# Patient Record
Sex: FEMALE | Race: WHITE | NOT HISPANIC OR LATINO | Employment: OTHER | URBAN - METROPOLITAN AREA
[De-identification: names, ages, dates, MRNs, and addresses within clinical notes are randomized per-mention and may not be internally consistent; named-entity substitution may affect disease eponyms.]

---

## 2017-01-22 ENCOUNTER — COMMUNICATION - HEALTHEAST (OUTPATIENT)
Dept: SCHEDULING | Facility: CLINIC | Age: 57
End: 2017-01-22

## 2017-01-22 DIAGNOSIS — E03.9 HYPOTHYROID: ICD-10-CM

## 2017-01-25 ENCOUNTER — OFFICE VISIT - HEALTHEAST (OUTPATIENT)
Dept: FAMILY MEDICINE | Facility: CLINIC | Age: 57
End: 2017-01-25

## 2017-01-25 ENCOUNTER — RECORDS - HEALTHEAST (OUTPATIENT)
Dept: GENERAL RADIOLOGY | Facility: CLINIC | Age: 57
End: 2017-01-25

## 2017-01-25 DIAGNOSIS — M54.2 CERVICALGIA: ICD-10-CM

## 2017-01-25 DIAGNOSIS — M54.2 NECK PAIN: ICD-10-CM

## 2017-01-25 ASSESSMENT — MIFFLIN-ST. JEOR: SCORE: 1251.7

## 2017-01-30 ENCOUNTER — COMMUNICATION - HEALTHEAST (OUTPATIENT)
Dept: FAMILY MEDICINE | Facility: CLINIC | Age: 57
End: 2017-01-30

## 2017-01-31 ENCOUNTER — OFFICE VISIT - HEALTHEAST (OUTPATIENT)
Dept: PHYSICAL THERAPY | Facility: CLINIC | Age: 57
End: 2017-01-31

## 2017-01-31 DIAGNOSIS — M54.2 CERVICALGIA: ICD-10-CM

## 2017-01-31 DIAGNOSIS — E03.9 HYPOTHYROIDISM: ICD-10-CM

## 2017-02-08 ENCOUNTER — OFFICE VISIT - HEALTHEAST (OUTPATIENT)
Dept: PHYSICAL THERAPY | Facility: CLINIC | Age: 57
End: 2017-02-08

## 2017-02-08 DIAGNOSIS — E03.9 HYPOTHYROIDISM: ICD-10-CM

## 2017-02-08 DIAGNOSIS — M54.2 CERVICALGIA: ICD-10-CM

## 2017-02-15 ENCOUNTER — OFFICE VISIT - HEALTHEAST (OUTPATIENT)
Dept: PHYSICAL THERAPY | Facility: CLINIC | Age: 57
End: 2017-02-15

## 2017-02-15 DIAGNOSIS — M54.2 CERVICALGIA: ICD-10-CM

## 2017-02-15 DIAGNOSIS — E03.9 HYPOTHYROIDISM: ICD-10-CM

## 2017-02-20 ENCOUNTER — COMMUNICATION - HEALTHEAST (OUTPATIENT)
Dept: FAMILY MEDICINE | Facility: CLINIC | Age: 57
End: 2017-02-20

## 2017-02-20 ENCOUNTER — OFFICE VISIT - HEALTHEAST (OUTPATIENT)
Dept: FAMILY MEDICINE | Facility: CLINIC | Age: 57
End: 2017-02-20

## 2017-02-20 ENCOUNTER — HOSPITAL ENCOUNTER (OUTPATIENT)
Dept: MAMMOGRAPHY | Facility: HOSPITAL | Age: 57
Discharge: HOME | End: 2017-02-20
Attending: FAMILY MEDICINE

## 2017-02-20 DIAGNOSIS — D25.9 LEIOMYOMA OF UTERUS, UNSPECIFIED: ICD-10-CM

## 2017-02-20 DIAGNOSIS — E55.9 VITAMIN D DEFICIENCY: ICD-10-CM

## 2017-02-20 DIAGNOSIS — M54.2 NECK PAIN, MUSCULOSKELETAL: ICD-10-CM

## 2017-02-20 DIAGNOSIS — N36.2 URETHRAL POLYP: ICD-10-CM

## 2017-02-20 DIAGNOSIS — E03.9 HYPOTHYROIDISM: ICD-10-CM

## 2017-02-20 DIAGNOSIS — Z00.00 WELL ADULT EXAM: ICD-10-CM

## 2017-02-20 DIAGNOSIS — Z12.31 VISIT FOR SCREENING MAMMOGRAM: ICD-10-CM

## 2017-02-20 LAB
CHOLEST SERPL-MCNC: 202 MG/DL
FASTING STATUS PATIENT QL REPORTED: YES
HDLC SERPL-MCNC: 88 MG/DL
LDLC SERPL CALC-MCNC: 104 MG/DL
TRIGL SERPL-MCNC: 52 MG/DL

## 2017-02-20 ASSESSMENT — MIFFLIN-ST. JEOR: SCORE: 1211.79

## 2017-02-24 ENCOUNTER — OFFICE VISIT - HEALTHEAST (OUTPATIENT)
Dept: PHYSICAL THERAPY | Facility: CLINIC | Age: 57
End: 2017-02-24

## 2017-02-24 DIAGNOSIS — E03.9 HYPOTHYROIDISM: ICD-10-CM

## 2017-02-24 DIAGNOSIS — M54.2 CERVICALGIA: ICD-10-CM

## 2017-05-23 ENCOUNTER — COMMUNICATION - HEALTHEAST (OUTPATIENT)
Dept: SCHEDULING | Facility: CLINIC | Age: 57
End: 2017-05-23

## 2017-05-23 DIAGNOSIS — E03.9 HYPOTHYROID: ICD-10-CM

## 2017-10-16 ENCOUNTER — RECORDS - HEALTHEAST (OUTPATIENT)
Dept: ADMINISTRATIVE | Facility: OTHER | Age: 57
End: 2017-10-16

## 2017-11-13 ENCOUNTER — OFFICE VISIT - HEALTHEAST (OUTPATIENT)
Dept: FAMILY MEDICINE | Facility: CLINIC | Age: 57
End: 2017-11-13

## 2017-11-13 DIAGNOSIS — F41.9 ANXIETY: ICD-10-CM

## 2017-11-17 ENCOUNTER — COMMUNICATION - HEALTHEAST (OUTPATIENT)
Dept: FAMILY MEDICINE | Facility: CLINIC | Age: 57
End: 2017-11-17

## 2017-12-19 ENCOUNTER — OFFICE VISIT - HEALTHEAST (OUTPATIENT)
Dept: FAMILY MEDICINE | Facility: CLINIC | Age: 57
End: 2017-12-19

## 2017-12-19 DIAGNOSIS — S80.11XA: ICD-10-CM

## 2018-02-22 ENCOUNTER — OFFICE VISIT - HEALTHEAST (OUTPATIENT)
Dept: FAMILY MEDICINE | Facility: CLINIC | Age: 58
End: 2018-02-22

## 2018-02-22 ENCOUNTER — COMMUNICATION - HEALTHEAST (OUTPATIENT)
Dept: FAMILY MEDICINE | Facility: CLINIC | Age: 58
End: 2018-02-22

## 2018-02-22 DIAGNOSIS — Z12.31 VISIT FOR SCREENING MAMMOGRAM: ICD-10-CM

## 2018-02-22 DIAGNOSIS — Z00.00 ROUTINE GENERAL MEDICAL EXAMINATION AT A HEALTH CARE FACILITY: ICD-10-CM

## 2018-02-22 DIAGNOSIS — Z13.820 OSTEOPOROSIS SCREENING: ICD-10-CM

## 2018-02-22 DIAGNOSIS — Z86.39 HISTORY OF VITAMIN D DEFICIENCY: ICD-10-CM

## 2018-02-22 DIAGNOSIS — Z12.83 SKIN CANCER SCREENING: ICD-10-CM

## 2018-02-22 DIAGNOSIS — D48.5 NEOPLASM OF UNCERTAIN BEHAVIOR OF SKIN: ICD-10-CM

## 2018-02-22 DIAGNOSIS — E03.9 HYPOTHYROIDISM: ICD-10-CM

## 2018-02-22 DIAGNOSIS — N36.2 URETHRAL CARUNCLE: ICD-10-CM

## 2018-02-22 LAB
CHOLEST SERPL-MCNC: 240 MG/DL
FASTING STATUS PATIENT QL REPORTED: YES
FASTING STATUS PATIENT QL REPORTED: YES
GLUCOSE BLD-MCNC: 79 MG/DL (ref 70–125)
HDLC SERPL-MCNC: 93 MG/DL
HGB BLD-MCNC: 13.5 G/DL (ref 12–16)
LDLC SERPL CALC-MCNC: 131 MG/DL
TRIGL SERPL-MCNC: 78 MG/DL
TSH SERPL DL<=0.005 MIU/L-ACNC: 0.26 UIU/ML (ref 0.3–5)

## 2018-02-22 ASSESSMENT — MIFFLIN-ST. JEOR: SCORE: 1233.34

## 2018-02-23 LAB
25(OH)D3 SERPL-MCNC: 35.2 NG/ML (ref 30–80)
25(OH)D3 SERPL-MCNC: 35.2 NG/ML (ref 30–80)

## 2018-02-28 ENCOUNTER — COMMUNICATION - HEALTHEAST (OUTPATIENT)
Dept: FAMILY MEDICINE | Facility: CLINIC | Age: 58
End: 2018-02-28

## 2018-02-28 ENCOUNTER — AMBULATORY - HEALTHEAST (OUTPATIENT)
Dept: FAMILY MEDICINE | Facility: CLINIC | Age: 58
End: 2018-02-28

## 2018-02-28 DIAGNOSIS — E03.9 HYPOTHYROIDISM: ICD-10-CM

## 2018-03-02 ENCOUNTER — COMMUNICATION - HEALTHEAST (OUTPATIENT)
Dept: FAMILY MEDICINE | Facility: CLINIC | Age: 58
End: 2018-03-02

## 2018-03-08 ENCOUNTER — RECORDS - HEALTHEAST (OUTPATIENT)
Dept: ADMINISTRATIVE | Facility: OTHER | Age: 58
End: 2018-03-08

## 2018-03-09 ENCOUNTER — RECORDS - HEALTHEAST (OUTPATIENT)
Dept: BONE DENSITY | Facility: CLINIC | Age: 58
End: 2018-03-09

## 2018-03-09 ENCOUNTER — RECORDS - HEALTHEAST (OUTPATIENT)
Dept: ADMINISTRATIVE | Facility: OTHER | Age: 58
End: 2018-03-09

## 2018-03-09 DIAGNOSIS — Z13.820 ENCOUNTER FOR SCREENING FOR OSTEOPOROSIS: ICD-10-CM

## 2018-03-14 ENCOUNTER — COMMUNICATION - HEALTHEAST (OUTPATIENT)
Dept: FAMILY MEDICINE | Facility: CLINIC | Age: 58
End: 2018-03-14

## 2018-03-23 ENCOUNTER — HOSPITAL ENCOUNTER (OUTPATIENT)
Dept: MAMMOGRAPHY | Facility: CLINIC | Age: 58
Discharge: HOME | End: 2018-03-23
Attending: FAMILY MEDICINE

## 2018-03-23 DIAGNOSIS — Z12.31 VISIT FOR SCREENING MAMMOGRAM: ICD-10-CM

## 2018-03-27 ENCOUNTER — COMMUNICATION - HEALTHEAST (OUTPATIENT)
Dept: FAMILY MEDICINE | Facility: CLINIC | Age: 58
End: 2018-03-27

## 2018-04-03 ENCOUNTER — COMMUNICATION - HEALTHEAST (OUTPATIENT)
Dept: FAMILY MEDICINE | Facility: CLINIC | Age: 58
End: 2018-04-03

## 2018-04-04 ENCOUNTER — COMMUNICATION - HEALTHEAST (OUTPATIENT)
Dept: FAMILY MEDICINE | Facility: CLINIC | Age: 58
End: 2018-04-04

## 2018-04-04 DIAGNOSIS — E03.9 HYPOTHYROIDISM: ICD-10-CM

## 2018-04-26 ENCOUNTER — RECORDS - HEALTHEAST (OUTPATIENT)
Dept: ADMINISTRATIVE | Facility: OTHER | Age: 58
End: 2018-04-26

## 2018-06-11 ENCOUNTER — OFFICE VISIT - HEALTHEAST (OUTPATIENT)
Dept: FAMILY MEDICINE | Facility: CLINIC | Age: 58
End: 2018-06-11
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
## 2018-06-11 DIAGNOSIS — E03.9 HYPOTHYROIDISM: ICD-10-CM

## 2018-06-11 LAB
ERYTHROCYTE [DISTWIDTH] IN BLOOD BY AUTOMATED COUNT: 12.3 % (ref 11–14.5)
HCT VFR BLD AUTO: 39.8 % (ref 35–47)
HGB BLD-MCNC: 13.3 G/DL (ref 12–16)
MCH RBC QN AUTO: 31.7 PG (ref 27–34)
MCHC RBC AUTO-ENTMCNC: 33.4 G/DL (ref 32–36)
MCV RBC AUTO: 95 FL (ref 80–100)
PLATELET # BLD AUTO: 169 THOU/UL (ref 140–440)
PMV BLD AUTO: 9.8 FL (ref 8.5–12.5)
RBC # BLD AUTO: 4.19 MILL/UL (ref 3.8–5.4)
TSH SERPL DL<=0.005 MIU/L-ACNC: 1.41 UIU/ML (ref 0.3–5)
WBC: 5.4 THOU/UL (ref 4–11)

## 2018-06-15 ENCOUNTER — OFFICE VISIT - HEALTHEAST (OUTPATIENT)
Dept: FAMILY MEDICINE | Facility: CLINIC | Age: 58
End: 2018-06-15
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
## 2018-06-15 DIAGNOSIS — S91.009A OPEN WOUND OF KNEE, LEG, AND ANKLE: ICD-10-CM

## 2018-06-15 DIAGNOSIS — S81.009A OPEN WOUND OF KNEE, LEG, AND ANKLE: ICD-10-CM

## 2018-06-15 DIAGNOSIS — S81.809A OPEN WOUND OF KNEE, LEG, AND ANKLE: ICD-10-CM

## 2018-06-29 LAB — RABV NAB SER NT-ACNC: NORMAL IU/ML

## 2018-07-05 ENCOUNTER — OFFICE VISIT - HEALTHEAST (OUTPATIENT)
Dept: VASCULAR SURGERY | Facility: CLINIC | Age: 58
End: 2018-07-05

## 2018-07-05 DIAGNOSIS — T14.8XXA BITE WOUND: ICD-10-CM

## 2018-07-05 DIAGNOSIS — T14.8XXA LOCAL INFECTION OF WOUND: ICD-10-CM

## 2018-07-05 DIAGNOSIS — L08.9 LOCAL INFECTION OF WOUND: ICD-10-CM

## 2018-07-31 ENCOUNTER — COMMUNICATION - HEALTHEAST (OUTPATIENT)
Dept: FAMILY MEDICINE | Facility: CLINIC | Age: 58
End: 2018-07-31

## 2018-07-31 DIAGNOSIS — E03.9 HYPOTHYROIDISM: ICD-10-CM

## 2018-08-02 ENCOUNTER — OFFICE VISIT - HEALTHEAST (OUTPATIENT)
Dept: VASCULAR SURGERY | Facility: CLINIC | Age: 58
End: 2018-08-02

## 2018-08-02 DIAGNOSIS — T14.8XXA BITE WOUND: ICD-10-CM

## 2018-08-06 ENCOUNTER — COMMUNICATION - HEALTHEAST (OUTPATIENT)
Dept: FAMILY MEDICINE | Facility: CLINIC | Age: 58
End: 2018-08-06

## 2018-08-06 DIAGNOSIS — N95.2 VAGINAL ATROPHY: ICD-10-CM

## 2018-08-07 RX ORDER — ESTRADIOL 0.1 MG/G
CREAM VAGINAL
Qty: 42.5 G | Refills: 5 | Status: SHIPPED | OUTPATIENT
Start: 2018-08-07 | End: 2022-08-06

## 2018-09-10 ENCOUNTER — RECORDS - HEALTHEAST (OUTPATIENT)
Dept: ADMINISTRATIVE | Facility: OTHER | Age: 58
End: 2018-09-10

## 2018-09-26 ENCOUNTER — COMMUNICATION - HEALTHEAST (OUTPATIENT)
Dept: FAMILY MEDICINE | Facility: CLINIC | Age: 58
End: 2018-09-26

## 2018-09-26 DIAGNOSIS — D25.9 UTERINE FIBROID: ICD-10-CM

## 2018-09-26 DIAGNOSIS — R10.2 PELVIC PAIN IN FEMALE: ICD-10-CM

## 2018-09-28 ENCOUNTER — HOSPITAL ENCOUNTER (OUTPATIENT)
Dept: ULTRASOUND IMAGING | Facility: HOSPITAL | Age: 58
Discharge: HOME | End: 2018-09-28
Attending: FAMILY MEDICINE

## 2018-09-28 DIAGNOSIS — R10.2 PELVIC PAIN IN FEMALE: ICD-10-CM

## 2018-09-28 DIAGNOSIS — D25.9 UTERINE FIBROID: ICD-10-CM

## 2018-10-01 ENCOUNTER — COMMUNICATION - HEALTHEAST (OUTPATIENT)
Dept: FAMILY MEDICINE | Facility: CLINIC | Age: 58
End: 2018-10-01

## 2018-12-21 ENCOUNTER — RECORDS - HEALTHEAST (OUTPATIENT)
Dept: ADMINISTRATIVE | Facility: OTHER | Age: 58
End: 2018-12-21

## 2019-02-14 ENCOUNTER — RECORDS - HEALTHEAST (OUTPATIENT)
Dept: ADMINISTRATIVE | Facility: OTHER | Age: 59
End: 2019-02-14

## 2019-03-01 ENCOUNTER — HOSPITAL ENCOUNTER (OUTPATIENT)
Dept: ULTRASOUND IMAGING | Facility: HOSPITAL | Age: 59
Discharge: HOME | End: 2019-03-01
Attending: FAMILY MEDICINE

## 2019-03-01 ENCOUNTER — OFFICE VISIT - HEALTHEAST (OUTPATIENT)
Dept: FAMILY MEDICINE | Facility: CLINIC | Age: 59
End: 2019-03-01

## 2019-03-01 DIAGNOSIS — Z00.00 ROUTINE GENERAL MEDICAL EXAMINATION AT A HEALTH CARE FACILITY: ICD-10-CM

## 2019-03-01 DIAGNOSIS — N36.2 URETHRAL CARUNCLE: ICD-10-CM

## 2019-03-01 DIAGNOSIS — Z12.31 VISIT FOR SCREENING MAMMOGRAM: ICD-10-CM

## 2019-03-01 DIAGNOSIS — E03.9 ACQUIRED HYPOTHYROIDISM: ICD-10-CM

## 2019-03-01 DIAGNOSIS — Z85.828 HISTORY OF SQUAMOUS CELL CARCINOMA OF SKIN: ICD-10-CM

## 2019-03-01 DIAGNOSIS — M79.661 RIGHT CALF PAIN: ICD-10-CM

## 2019-03-01 LAB
CHOLEST SERPL-MCNC: 248 MG/DL
FASTING STATUS PATIENT QL REPORTED: NO
FASTING STATUS PATIENT QL REPORTED: NO
GLUCOSE BLD-MCNC: 72 MG/DL (ref 70–125)
HDLC SERPL-MCNC: 94 MG/DL
HGB BLD-MCNC: 14.1 G/DL (ref 12–16)
LDLC SERPL CALC-MCNC: 131 MG/DL
TRIGL SERPL-MCNC: 117 MG/DL
TSH SERPL DL<=0.005 MIU/L-ACNC: 13.33 UIU/ML (ref 0.3–5)

## 2019-03-01 ASSESSMENT — MIFFLIN-ST. JEOR: SCORE: 1253.97

## 2019-03-04 ENCOUNTER — AMBULATORY - HEALTHEAST (OUTPATIENT)
Dept: FAMILY MEDICINE | Facility: CLINIC | Age: 59
End: 2019-03-04

## 2019-03-04 DIAGNOSIS — E03.9 ACQUIRED HYPOTHYROIDISM: ICD-10-CM

## 2019-06-25 ENCOUNTER — HOSPITAL ENCOUNTER (OUTPATIENT)
Dept: MAMMOGRAPHY | Facility: CLINIC | Age: 59
Discharge: HOME | End: 2019-06-25
Attending: FAMILY MEDICINE

## 2019-06-25 DIAGNOSIS — Z12.31 VISIT FOR SCREENING MAMMOGRAM: ICD-10-CM

## 2019-08-30 ENCOUNTER — RECORDS - HEALTHEAST (OUTPATIENT)
Dept: ADMINISTRATIVE | Facility: OTHER | Age: 59
End: 2019-08-30

## 2019-10-25 ENCOUNTER — RECORDS - HEALTHEAST (OUTPATIENT)
Dept: ADMINISTRATIVE | Facility: OTHER | Age: 59
End: 2019-10-25

## 2020-02-07 ENCOUNTER — RECORDS - HEALTHEAST (OUTPATIENT)
Dept: ADMINISTRATIVE | Facility: OTHER | Age: 60
End: 2020-02-07

## 2020-02-25 ENCOUNTER — COMMUNICATION - HEALTHEAST (OUTPATIENT)
Dept: FAMILY MEDICINE | Facility: CLINIC | Age: 60
End: 2020-02-25

## 2020-02-25 DIAGNOSIS — L29.9 ITCHING: ICD-10-CM

## 2020-03-19 ENCOUNTER — COMMUNICATION - HEALTHEAST (OUTPATIENT)
Dept: FAMILY MEDICINE | Facility: CLINIC | Age: 60
End: 2020-03-19

## 2020-04-03 ENCOUNTER — COMMUNICATION - HEALTHEAST (OUTPATIENT)
Dept: FAMILY MEDICINE | Facility: CLINIC | Age: 60
End: 2020-04-03

## 2020-04-03 DIAGNOSIS — N64.4 BREAST PAIN, LEFT: ICD-10-CM

## 2020-04-03 DIAGNOSIS — L29.9 ITCHING: ICD-10-CM

## 2020-05-01 ENCOUNTER — HOSPITAL ENCOUNTER (OUTPATIENT)
Dept: MAMMOGRAPHY | Facility: CLINIC | Age: 60
Discharge: HOME | End: 2020-05-01
Attending: FAMILY MEDICINE

## 2020-05-01 DIAGNOSIS — N64.4 BREAST PAIN, LEFT: ICD-10-CM

## 2020-07-10 ENCOUNTER — OFFICE VISIT - HEALTHEAST (OUTPATIENT)
Dept: FAMILY MEDICINE | Facility: CLINIC | Age: 60
End: 2020-07-10

## 2020-07-10 DIAGNOSIS — Z00.00 ANNUAL PHYSICAL EXAM: ICD-10-CM

## 2020-07-10 DIAGNOSIS — R07.89 CHEST WALL PAIN: ICD-10-CM

## 2020-07-10 DIAGNOSIS — E03.9 HYPOTHYROIDISM: ICD-10-CM

## 2020-07-10 DIAGNOSIS — Z11.4 ENCOUNTER FOR SCREENING FOR HIV: ICD-10-CM

## 2020-07-10 DIAGNOSIS — Z11.59 NEED FOR HEPATITIS C SCREENING TEST: ICD-10-CM

## 2020-07-10 LAB
CHOLEST SERPL-MCNC: 212 MG/DL
FASTING STATUS PATIENT QL REPORTED: YES
FASTING STATUS PATIENT QL REPORTED: YES
GLUCOSE BLD-MCNC: 74 MG/DL (ref 70–125)
HDLC SERPL-MCNC: 85 MG/DL
HGB BLD-MCNC: 12.7 G/DL (ref 12–16)
HIV 1+2 AB+HIV1 P24 AG SERPL QL IA: NEGATIVE
LDLC SERPL CALC-MCNC: 112 MG/DL
TRIGL SERPL-MCNC: 77 MG/DL
TSH SERPL DL<=0.005 MIU/L-ACNC: 14.17 UIU/ML (ref 0.3–5)

## 2020-07-10 ASSESSMENT — MIFFLIN-ST. JEOR: SCORE: 1151.69

## 2020-07-13 LAB
HCV AB SERPL QL IA: NEGATIVE
HPV SOURCE: NORMAL
HUMAN PAPILLOMA VIRUS 16 DNA: NEGATIVE
HUMAN PAPILLOMA VIRUS 18 DNA: NEGATIVE
HUMAN PAPILLOMA VIRUS FINAL DIAGNOSIS: NORMAL
HUMAN PAPILLOMA VIRUS OTHER HR: NEGATIVE
SPECIMEN DESCRIPTION: NORMAL

## 2020-07-14 ENCOUNTER — AMBULATORY - HEALTHEAST (OUTPATIENT)
Dept: FAMILY MEDICINE | Facility: CLINIC | Age: 60
End: 2020-07-14

## 2020-07-14 ENCOUNTER — COMMUNICATION - HEALTHEAST (OUTPATIENT)
Dept: FAMILY MEDICINE | Facility: CLINIC | Age: 60
End: 2020-07-14

## 2020-07-14 DIAGNOSIS — E03.9 ACQUIRED HYPOTHYROIDISM: ICD-10-CM

## 2020-09-16 ENCOUNTER — RECORDS - HEALTHEAST (OUTPATIENT)
Dept: ADMINISTRATIVE | Facility: OTHER | Age: 60
End: 2020-09-16

## 2020-09-21 ENCOUNTER — COMMUNICATION - HEALTHEAST (OUTPATIENT)
Dept: FAMILY MEDICINE | Facility: CLINIC | Age: 60
End: 2020-09-21

## 2020-12-21 ENCOUNTER — COMMUNICATION - HEALTHEAST (OUTPATIENT)
Dept: FAMILY MEDICINE | Facility: CLINIC | Age: 60
End: 2020-12-21

## 2020-12-24 ENCOUNTER — AMBULATORY - HEALTHEAST (OUTPATIENT)
Dept: LAB | Facility: CLINIC | Age: 60
End: 2020-12-24

## 2020-12-24 DIAGNOSIS — E03.9 ACQUIRED HYPOTHYROIDISM: ICD-10-CM

## 2020-12-24 LAB — TSH SERPL DL<=0.005 MIU/L-ACNC: 27.59 UIU/ML (ref 0.3–5)

## 2020-12-25 ENCOUNTER — COMMUNICATION - HEALTHEAST (OUTPATIENT)
Dept: FAMILY MEDICINE | Facility: CLINIC | Age: 60
End: 2020-12-25

## 2020-12-28 ENCOUNTER — COMMUNICATION - HEALTHEAST (OUTPATIENT)
Dept: FAMILY MEDICINE | Facility: CLINIC | Age: 60
End: 2020-12-28

## 2020-12-28 DIAGNOSIS — E03.9 ACQUIRED HYPOTHYROIDISM: ICD-10-CM

## 2021-01-26 ENCOUNTER — COMMUNICATION - HEALTHEAST (OUTPATIENT)
Dept: FAMILY MEDICINE | Facility: CLINIC | Age: 61
End: 2021-01-26

## 2021-01-29 ENCOUNTER — OFFICE VISIT - HEALTHEAST (OUTPATIENT)
Dept: FAMILY MEDICINE | Facility: CLINIC | Age: 61
End: 2021-01-29

## 2021-01-29 DIAGNOSIS — R07.89 LEFT-SIDED CHEST WALL PAIN: ICD-10-CM

## 2021-01-29 DIAGNOSIS — N64.4 BREAST PAIN, LEFT: ICD-10-CM

## 2021-02-03 ENCOUNTER — COMMUNICATION - HEALTHEAST (OUTPATIENT)
Dept: FAMILY MEDICINE | Facility: CLINIC | Age: 61
End: 2021-02-03

## 2021-02-08 ENCOUNTER — COMMUNICATION - HEALTHEAST (OUTPATIENT)
Dept: FAMILY MEDICINE | Facility: CLINIC | Age: 61
End: 2021-02-08

## 2021-02-08 DIAGNOSIS — L29.9 ITCHING: ICD-10-CM

## 2021-02-08 RX ORDER — HYDROXYZINE HYDROCHLORIDE 25 MG/1
25 TABLET, FILM COATED ORAL EVERY 8 HOURS PRN
Qty: 30 TABLET | Refills: 3 | Status: SHIPPED | OUTPATIENT
Start: 2021-02-08 | End: 2021-08-04

## 2021-02-15 ENCOUNTER — AMBULATORY - HEALTHEAST (OUTPATIENT)
Dept: LAB | Facility: CLINIC | Age: 61
End: 2021-02-15

## 2021-02-15 DIAGNOSIS — E03.9 ACQUIRED HYPOTHYROIDISM: ICD-10-CM

## 2021-02-15 LAB — TSH SERPL DL<=0.005 MIU/L-ACNC: 0.05 UIU/ML (ref 0.3–5)

## 2021-02-16 ENCOUNTER — COMMUNICATION - HEALTHEAST (OUTPATIENT)
Dept: FAMILY MEDICINE | Facility: CLINIC | Age: 61
End: 2021-02-16

## 2021-02-16 ENCOUNTER — AMBULATORY - HEALTHEAST (OUTPATIENT)
Dept: FAMILY MEDICINE | Facility: CLINIC | Age: 61
End: 2021-02-16

## 2021-02-16 DIAGNOSIS — E03.9 ACQUIRED HYPOTHYROIDISM: ICD-10-CM

## 2021-03-19 ENCOUNTER — COMMUNICATION - HEALTHEAST (OUTPATIENT)
Dept: FAMILY MEDICINE | Facility: CLINIC | Age: 61
End: 2021-03-19

## 2021-03-19 DIAGNOSIS — E03.9 HYPOTHYROIDISM: ICD-10-CM

## 2021-03-22 ENCOUNTER — COMMUNICATION - HEALTHEAST (OUTPATIENT)
Dept: FAMILY MEDICINE | Facility: CLINIC | Age: 61
End: 2021-03-22

## 2021-03-22 DIAGNOSIS — E03.9 ACQUIRED HYPOTHYROIDISM: ICD-10-CM

## 2021-03-29 ENCOUNTER — AMBULATORY - HEALTHEAST (OUTPATIENT)
Dept: LAB | Facility: CLINIC | Age: 61
End: 2021-03-29

## 2021-03-29 DIAGNOSIS — E03.9 HYPOTHYROIDISM: ICD-10-CM

## 2021-03-29 LAB — TSH SERPL DL<=0.005 MIU/L-ACNC: 0.09 UIU/ML (ref 0.3–5)

## 2021-04-01 ENCOUNTER — AMBULATORY - HEALTHEAST (OUTPATIENT)
Dept: FAMILY MEDICINE | Facility: CLINIC | Age: 61
End: 2021-04-01

## 2021-04-01 DIAGNOSIS — E03.9 ACQUIRED HYPOTHYROIDISM: ICD-10-CM

## 2021-04-01 RX ORDER — LEVOTHYROXINE SODIUM 112 UG/1
112 TABLET ORAL DAILY
Qty: 90 TABLET | Refills: 3 | Status: SHIPPED | OUTPATIENT
Start: 2021-04-01 | End: 2022-03-02

## 2021-04-03 ENCOUNTER — COMMUNICATION - HEALTHEAST (OUTPATIENT)
Dept: FAMILY MEDICINE | Facility: CLINIC | Age: 61
End: 2021-04-03

## 2021-04-16 ENCOUNTER — AMBULATORY - HEALTHEAST (OUTPATIENT)
Dept: NURSING | Facility: CLINIC | Age: 61
End: 2021-04-16

## 2021-05-07 ENCOUNTER — AMBULATORY - HEALTHEAST (OUTPATIENT)
Dept: NURSING | Facility: CLINIC | Age: 61
End: 2021-05-07

## 2021-05-10 ENCOUNTER — HOSPITAL ENCOUNTER (OUTPATIENT)
Dept: MAMMOGRAPHY | Facility: CLINIC | Age: 61
Discharge: HOME | End: 2021-05-10
Attending: FAMILY MEDICINE

## 2021-05-10 DIAGNOSIS — Z12.31 VISIT FOR SCREENING MAMMOGRAM: ICD-10-CM

## 2021-05-27 ENCOUNTER — RECORDS - HEALTHEAST (OUTPATIENT)
Dept: ADMINISTRATIVE | Facility: CLINIC | Age: 61
End: 2021-05-27

## 2021-05-27 ENCOUNTER — AMBULATORY - HEALTHEAST (OUTPATIENT)
Dept: LAB | Facility: CLINIC | Age: 61
End: 2021-05-27

## 2021-05-27 DIAGNOSIS — E03.9 ACQUIRED HYPOTHYROIDISM: ICD-10-CM

## 2021-05-27 LAB — TSH SERPL DL<=0.005 MIU/L-ACNC: 0.58 UIU/ML (ref 0.3–5)

## 2021-05-28 ENCOUNTER — RECORDS - HEALTHEAST (OUTPATIENT)
Dept: ADMINISTRATIVE | Facility: CLINIC | Age: 61
End: 2021-05-28

## 2021-05-29 ENCOUNTER — RECORDS - HEALTHEAST (OUTPATIENT)
Dept: ADMINISTRATIVE | Facility: CLINIC | Age: 61
End: 2021-05-29

## 2021-05-30 ENCOUNTER — RECORDS - HEALTHEAST (OUTPATIENT)
Dept: ADMINISTRATIVE | Facility: CLINIC | Age: 61
End: 2021-05-30

## 2021-05-30 VITALS — HEIGHT: 68 IN | BODY MASS INDEX: 21.49 KG/M2 | WEIGHT: 141.8 LBS

## 2021-05-30 VITALS — BODY MASS INDEX: 22.5 KG/M2 | HEIGHT: 66 IN | WEIGHT: 140 LBS

## 2021-05-31 ENCOUNTER — RECORDS - HEALTHEAST (OUTPATIENT)
Dept: ADMINISTRATIVE | Facility: CLINIC | Age: 61
End: 2021-05-31

## 2021-05-31 VITALS — HEIGHT: 67 IN | BODY MASS INDEX: 22.17 KG/M2 | WEIGHT: 141.25 LBS

## 2021-05-31 VITALS — BODY MASS INDEX: 22.6 KG/M2 | WEIGHT: 140 LBS

## 2021-05-31 VITALS — BODY MASS INDEX: 22.84 KG/M2 | WEIGHT: 141.5 LBS

## 2021-06-01 ENCOUNTER — RECORDS - HEALTHEAST (OUTPATIENT)
Dept: ADMINISTRATIVE | Facility: CLINIC | Age: 61
End: 2021-06-01

## 2021-06-01 VITALS — WEIGHT: 138.06 LBS | BODY MASS INDEX: 21.62 KG/M2

## 2021-06-01 VITALS — WEIGHT: 139.31 LBS | BODY MASS INDEX: 21.82 KG/M2

## 2021-06-02 ENCOUNTER — RECORDS - HEALTHEAST (OUTPATIENT)
Dept: ADMINISTRATIVE | Facility: CLINIC | Age: 61
End: 2021-06-02

## 2021-06-02 VITALS — WEIGHT: 145.8 LBS | BODY MASS INDEX: 22.88 KG/M2 | HEIGHT: 67 IN

## 2021-06-04 VITALS
OXYGEN SATURATION: 97 % | WEIGHT: 125 LBS | SYSTOLIC BLOOD PRESSURE: 121 MMHG | HEART RATE: 76 BPM | DIASTOLIC BLOOD PRESSURE: 80 MMHG | TEMPERATURE: 98.2 F | HEIGHT: 67 IN | BODY MASS INDEX: 19.62 KG/M2

## 2021-06-05 VITALS
SYSTOLIC BLOOD PRESSURE: 130 MMHG | OXYGEN SATURATION: 99 % | TEMPERATURE: 98.6 F | BODY MASS INDEX: 20.75 KG/M2 | HEART RATE: 97 BPM | WEIGHT: 130.5 LBS | DIASTOLIC BLOOD PRESSURE: 80 MMHG

## 2021-06-08 NOTE — PROGRESS NOTES
Assessment:     Gloria was seen today for neck pain and referral.    Diagnoses and all orders for this visit:    Neck pain  -     Cancel: XR Cervical Spine 4 - 5 VWS; Future  -     cyclobenzaprine (FLEXERIL) 10 MG tablet; Take 1 tablet (10 mg total) by mouth 3 (three) times a day as needed for muscle spasms.  -     naproxen (NAPROSYN) 500 MG tablet; Take 1 tablet (500 mg total) by mouth 2 (two) times a day with meals.  -     Ambulatory referral to PT/OT        Plan:     1. Neck pain  We'll review x-rays today and initiate OT PT at physical therapy.  She'll use cyclobenzaprine at night.  Low dose of 5 mg given  - cyclobenzaprine (FLEXERIL) 10 MG tablet; Take 1 tablet (10 mg total) by mouth 3 (three) times a day as needed for muscle spasms.  Dispense: 30 tablet; Refill: 0  - naproxen (NAPROSYN) 500 MG tablet; Take 1 tablet (500 mg total) by mouth 2 (two) times a day with meals.  Dispense: 60 tablet; Refill: 2  - Ambulatory referral to PT/OT      This is a 25 minute visit with greater than 50% of the time spent counseling regarding discussion of our workup with patient.  I'm pleased that she's tried chiropractic and acupuncture already.  She hasn't been able to alleviate her problem but it was a good try.  Should she also has made good progress with ergonomic adjustment at her work.  She got a new mouse and he can stand at times.  The use of her right arm could be contributing and aggravating her right neck issue.  This may need to be discussed as a work-related issue at some point.    We'll start with C-spine x-rays and physical therapy, Naprosyn and muscle relaxers.  She'll come back in about a month at which time we'll reassess.  If need be will move ahead towards MRI scanning of the neck and her spine/  May wish also to proceed to the [facility] on neck and spine clinic and/or trial of oral steroids which were discussed today but not used      Subjective:      Luis A is a 56 y.o. female presenting to my clinic  for evaluation of chronic right-sided neck pain.  Gloria says she's had this neck problem for maybe over a year.  She's tried ergonomic assessment of her work station with having a standing workstation and a new mouse.  She seen a chiropractor over the last several months and and has gone for acupuncture.    She works for the [employer].    When asked about trauma she says she broke her collarbone on the right which was 5 years old.  About 4 5 years ago she fell on the ice and hit her head.  She does seem to have lots of eustachian tube dysfunction and ear problems on the right and has seen ENT for that but is unable to make it go away..      She denies any symptomatology that radiates down the shoulder or into the arm.  She has no hand or arm dysfunction she tells me.  She noticed just this morning looking in the mirror that there is some bit of an asymmetry with left shoulder being higher than the right and sensitive a little bit of a neck rotation towards the right.  She does have some suspicion that this may have to do with her using her right arm for work.      Current Outpatient Prescriptions on File Prior to Visit   Medication Sig Dispense Refill     aspirin 81 MG EC tablet Take 1 tablet (81 mg total) by mouth daily.       B INFANTIS/B ANI/B EUSEBIO/B BIFID (PROBIOTIC 4X ORAL) Take by mouth.       calcium-vitamin D (CALCIUM-VITAMIN D) 500 mg(1,250mg) -200 unit per tablet Take 1 tablet by mouth 2 (two) times a day with meals.       ergocalciferol (ERGOCALCIFEROL) 50,000 unit capsule One tab twice a week for 12 weeks then refill. 24 capsule 1     estradiol (ESTRACE) 0.01 % (0.1 mg/gram) vaginal cream Use 0.5 gm three times a week. 42.5 g 5     levothyroxine (SYNTHROID, LEVOTHROID) 125 MCG tablet TAKE 1 TABLET BY MOUTH DAILY. 90 tablet 0     [DISCONTINUED] BLACK COHOSH ORAL Take by mouth.       No current facility-administered medications on file prior to visit.      No Known Allergies  Past Medical History  "  Diagnosis Date     Fibroids      Hypothyroidism      Vaginal atrophy      Past Surgical History   Procedure Laterality Date     Pr cystourethroscopy,fulgur <0.5 cm lesn       Description: Cystoscopy With Fulguration Minor Lesion (Under 5mm);  Recorded: 12/31/2012;     Social History     Social History     Marital status:      Spouse name: N/A     Number of children: N/A     Years of education: N/A     Occupational History     Not on file.     Social History Main Topics     Smoking status: Former Smoker     Packs/day: 0.25     Years: 3.00     Quit date: 1/19/1980     Smokeless tobacco: Never Used     Alcohol use 0.6 oz/week     1 Glasses of wine per week      Comment: On ocassions     Drug use: No     Sexual activity: Not Currently     Partners: Male     Birth control/ protection: Post-menopausal     Other Topics Concern     Not on file     Social History Narrative     Family History   Problem Relation Age of Onset     Hypothyroidism Mother      Narcolepsy Mother      Heart disease Father      Asthma Sister      Hypothyroidism Sister      Asthma Brother      Breast cancer Maternal Grandmother      Breast cancer Cousin      Breast cancer Cousin      Breast cancer Cousin        ROS:  I have performed a 10 point ROS.  All pertinent positives and negatives are found in the HPI.  All others are negative.    Denies symptoms going down the arm.    Objective:     Physical Exam:  Visit Vitals     /78 (Patient Site: Right Arm, Patient Position: Sitting, Cuff Size: Adult Regular)     Pulse 88     Ht 5' 8\" (1.727 m)     Wt 141 lb 12.8 oz (64.3 kg)     BMI 21.56 kg/m2     General Appearance: Alert, cooperative, no distress, appears stated age  Head: Normocephalic, without obvious abnormality, atraumatic    Throat: Lips, mucosa, and tongue normal; teeth and gums normal  Neck: Neck tension with pain in the cervical branch of the trapezius muscle and spasm palpated.  Trigger point to the right occiput.  There seems to " be an asymmetry with the spasm muscles are pulling her neck anatomy to the right., no adenopathy;  thyroid: not enlarged; no carotid bruit or JVD    Back: Upper back and neck shows asymmetry of musculature.  She seems to be rotated a bit to the right.  Shoulders are uneven  Extremities: Extremities normal, atraumatic, no cyanosis or edema/  Full range of motion with arm on the right and left.  No signs of impingement or rotator cuff issues  Skin: Skin color, texture, turgor normal, no rashes or lesions  Lymph nodes: Cervical, supraclavicular, and axillary nodes normal  Neurologic: Intact, no focal deficits /no neurological abnormalities 2 right arm  Mental status:  Appropriate, Affect normal          Imaging: We'll review today's C-spine x-rays

## 2021-06-08 NOTE — PROGRESS NOTES
[facility] Daily Progress     Patient Name: Gloria Myers  Date: 2017  Visit #:   Referral Diagnosis:Neck Pain  Referring provider: Kathleen Najera MD  Visit Diagnosis:     ICD-10-CM    1. Cervicalgia M54.2    2. Hypothyroidism E03.9          Assessment:   Right ant SI found which could cause a right long leg.  Patient demonstrates understanding/independence with home program.  Patient is benefitting from skilled physical therapy and is making steady progress toward functional goals.    Goal Status:  Patient Turn Head: for work;for driving;for conversation;Comment  Comment: in 8 weeks with no sharp right neck pain  Pt will: have end of day pain at work 0-4/10 in 8 weeks    Plan / Patient Education:   Add RC band strengthening, Check SI and continue MT- MAS? Arterial vs neural fascia  Continue with initial plan of care.  Progress with home program as tolerated.    Subjective:     Pain Ratin   I got up out of bed, and I felt a little dizzy.  My head and ears are always plugged      Objective:     Right PSIS high and + FB test , right leg longer in standing  C2 Spinous process prominent right and tight lacho than  Left scalene tightness    Treatment Today     TREATMENT MINUTES COMMENTS   Evaluation     Self-care/ Home management     Manual therapy 45 Supine MT/SCS to L MTC-LV, L STEM-LV, R MAS and PAR-LV, R VERT-A,  LF C2 and C5 MS, general stretching of ant scalenes bilat, suboccipital release   Neuromuscular Re-education     Therapeutic Activity     Therapeutic Exercises 15 Discussed SI anatomy, Self care and issued SI correction exercises   Gait training     Modality__________________                Total 60    Blank areas are intentional and mean the treatment did not include these items.       Nancy Celis  2017

## 2021-06-08 NOTE — PROGRESS NOTES
[facility] Daily Progress     Patient Name: Gloria Myers  Date: 2/15/2017  Visit #: 3/12  Referral Diagnosis: Neck Pain  Referring provider: Kathleen Najera MD  Visit Diagnosis:     ICD-10-CM    1. Cervicalgia M54.2    2. Hypothyroidism E03.9          Assessment:   Pt reports slightly less dizziness since starting PT. Balance testing shows no apparent dysfunction in the vestibular or LE strength, sl this feeling of imbalance is likely as a result of neck/upper vertebral/fascial dysfunction.  Patient demonstrates understanding/independence with home program.  Patient is benefitting from skilled physical therapy and is making steady progress toward functional goals.    Goal Status:  Patient Turn Head: for work;for driving;for conversation;Comment  Comment: in 8 weeks with no sharp right neck pain  Pt will: have end of day pain at work 0-4/10 in 8 weeks         Plan / Patient Education:   Continue MT to decrease right cervical paraspinal spasm, check goal of less sharp neck pain  Continue with initial plan of care.  Progress with home program as tolerated.    Subjective:     Pain Rating: 3  Less dizzy since treatment.  Right ear feels closed and pops  Pt reports she changed her mouse position at work, so that right elbow is at her side in neutral instead of shoulder flexed 90 degreees    Objective:     Romberg eyes open and closed 30 seconds, minimal sway  SL stand : bilat 25 seconds, but slight Trendelenburg on the right  Hip Adbuction strength: left 5, right 4+  PSIS level and neg FB test  Palpation shows tight right cervical paraspinal    Treatment Today     TREATMENT MINUTES COMMENTS   Evaluation     Self-care/ Home management     Manual therapy 5- nc STM right QL   Neuromuscular Re-education     Therapeutic Activity     Therapeutic Exercises 55 Added neck stretching, hip strengthening, and band shoulder strengthening. Yellow and orange rep band issued. Correct form emphasized   Gait training      Modality__________________                Total 60    Blank areas are intentional and mean the treatment did not include these items.       Nancy Celis  2/15/2017     Statement Selected

## 2021-06-08 NOTE — PROGRESS NOTES
[facility]   Cervical Thoracic Initial Evaluation    Patient Name: Gloria Myers  Date of evaluation: 1/31/2017  Referral Diagnosis: Neck pain  Referring provider: Kathleen Najera MD  Visit Diagnosis:     ICD-10-CM    1. Cervicalgia M54.2    2. Hypothyroidism E03.9        Assessment:    Pt appears to have fascial dysfunction of at least 3 systems primarily involving the head neck and rib cage.  Pt. is a good candidate for skilled PT services to improve pain levels and function.    Goals:  Patient Turn Head: for work;for driving;for conversation;Comment  Comment: in 8 weeks with no sharp right neck pain  Pt will: have end of day pain at work 0-4/10 in 8 weeks    Patient's expectations/goals are realistic.    Barriers to Learning or Achieving Goals:  No Barriers.       Plan / Patient Instructions:        Plan of Care:   Communication with: Referral Source  Patient Related Instruction: Nature of Condition;Treatment plan and rationale;Self Care instruction;Basis of treatment;Body mechanics;Posture;Precautions;Expected outcome  Times per Week: 1  Number of Weeks: 8  Number of Visits: 8  Discharge Planning: Independant in self management and home exercises  Therapeutic Exercise: ROM;Stretching;Strengthening  Neuromuscular Reeducation: kinesio tape;posture  Manual Therapy: soft tissue mobilization;myofascial release;joint mobilization;muscle energy;strain counterstrain;craniosacral therapy;visceral manipulation  Functional Training (ADL's): self care;ADL's;ergonomics  Equipment: theraband    Plan for next visit: Continue MT/SCS to neck, and add rotator cuff and scapular stengthening     Subjective:         Social information:   Living Situation:single family home, lives with others , stairs  with railing and has assistance Yes   Occupation:Clerical   Work Status:Working full time   Equipment Available: Has nothing but heating pad for the neck, does have orthotics,. Was issued an ergonomic keypad which  resolved UE tingling, and has a sit to stand desk    History of Present Illness:    Gloria is a 56 y.o. female who presents to therapy today with complaints of neck pain. SHe thinks some of this neck pain may have started after falling down the stairs due to ice in . A recent cervical X-ray 17 shows min narrowing of C4-5 and C6-7 with flattened neck curve and mild DJD of the lower cervical facets.  Pt reports she tried Naprosyn 2 days, but became very dizzy and had to stop.    Pain Ratin  Pain rating at best: 2  Pain rating at worst: 10  Pain description: Dull ache to shaarp severe pain such that pt sees stars. No pain or tingling down the right arm.    Functional limitations are described as occurring with:   End of work day (clerical job), and better on the weekends  Turning head to right- 10/10  Low grade HA most days (face area bilat)       Patient reports she uses heating pad to sleep at night in winter. She has gone to chiropractor for 2 years which helps, acupuncture 1 x on right sciatic type pain (lsharp lateral thigh). Self massage does help decrease pain.    Patient saw an orthopedist at [facility] ([facility]) for fallen arches and was given orthotics. She is only one pair of shoes she can wear the orthotics.         Objective:      Note: Items left blank indicates the item was not performed or not indicated at the time of the evaluation.    Patient Outcome Measures :    Neck Disability Score in %: 8   Scores range from 0-100%, where a score of 0% represents minimal pain and maximal function. The minmal clinically important difference is a score reduction of 10%.    Cervical Thoracic Examination  1. Cervicalgia     2. Hypothyroidism       Involved side: Right  Posture Observation:      Flattened mid thoracic area, and slight kyphosis with FHP. Pelvis ;level    Cervical ROM:    Date:      *Indicate scale AROM AROM AROM   Cervical Flexion 3 fingerwidths chin to chest     Cervical Extension  100% - no symptoms      Right Left Right Left Right Left   Cervical Sidebending         Cervical Rotation 78 74       Cervical Protraction      Cervical Retraction      Thoracic Flexion      Thoracic Extension      Thoracic Sidebending         Thoracic Rotation           Strength     Date:      Cervical Myotomes/5 Right Left Right Left Right Left   Cervical Flexion (C1-2)         Shoulder IR/ER 4+/4+ 4/4       Shoulder Elevation (C4)         Shoulder Abduction (C5) 4+ 4       Elbow Flexion (C6)         Elbow Extension (C7)         Wrist Flexion (C7)         Wrist Extension (C6)         Thumb abduction (C8)         Finger Abduction (T1)           Sensation        Reflex Testing  Cervical Dermatomes Right Left UE Reflexes Right Left   Back of the Head (C2)   Biceps (C5-6)     Supraclavicular Fossa (C3)   Brachioradialis (C5-6)     AC Joint (C4)   Triceps (C7-8)     Lateral Biceps (C5)   Rafa s test     Palmar Thumb (C6) nl nl LE Reflexes     Palmar 3rd Finger (C7) nl nl Patellar (L3-4)     Palmar 5th Finger (C8) nl nl Achilles (S1-2)     Ulnar Forearm (T1)   Babinski Response          test: Right 58#, left 56 ( in normal range)  Cranial scan: positive bilat head (has mild fascial pain - possibly sinus) and right neck for LV, right head and neck arterial and visceral  Palpation: Left scalene and right lev scap tight, extemely stiff and decrease right anterior rib motion ribs 1-4    Cervical Special Tests     Cervical Special Tests Right Left UE Nerve Mobility Right Left   Cervical compression   Median nerve     Cervical distraction   Ulnar nerve     Spurling s test neg neg Radial nerve     Shoulder abduction sign   Thoracic outlet     Deep neck flexor endurance test   Brooks     Upper cervical rotation   Adson s     Sharper-Edin   Cervical rotation lateral flexion     Alar ligament test   Other:     Other:   Other:       UE Screen: WFL shoulder flexibility    Treatment Today     TREATMENT MINUTES COMMENTS    Evaluation 30 Neck and Upper back/ribs   Self-care/ Home management 15 Discussed neutral spin and body position, need to change position at work,  how UE strength affects neck, and issued 2 easy stretches to change position.    Manual therapy 15 SUpine STM for left RADHA-LV, and L IJ-LV   Neuromuscular Re-education     Therapeutic Activity     Therapeutic Exercises     Gait training     Modality__________________       Other: patient was dizzy after getting up from plinth so we discussed how to protect neck during this transition         Total 60    Blank areas are intentional and mean the treatment did not include these items.     PT Evaluation Code: (Please list factors)  Patient History/Comorbidities: Neck pain, Hypothroid  Examination: 1 -neck  Clinical Presentation: Stable  Clinical Decision Making: Low    Patient History/  Comorbidities Examination  (body structures and functions, activity limitations, and/or participation restrictions) Clinical Presentation Clinical Decision Making (Complexity)   No documented Comorbidities or personal factors 1-2 Elements Stable and/or uncomplicated Low   1-2 documented comorbidities or personal factor 3 Elements Evolving clinical presentation with changing characteristics Moderate   3-4 documented comorbidities or personal factors 4 or more Unstable and unpredictable High               Nancy Celis  1/31/2017  4:03 PM

## 2021-06-09 NOTE — TELEPHONE ENCOUNTER
----- Message from Alisha Hale MD sent at 7/14/2020  8:53 AM CDT -----  Please let pt know that we need to increase the dose of levothyroxine. I will send a new prescription to her pharmacy. Please ask her to come in for lab visit in 6-8 weeks to recheck thyroid labs.

## 2021-06-09 NOTE — PROGRESS NOTES
[facility] Daily Progress /Discharge Summary    Patient Name: Gloria Myers  Date: 2017  Visit #:   Referral Diagnosis: Neck Pain  Referring provider: Kathleen Najera MD  Visit Diagnosis:     ICD-10-CM    1. Cervicalgia M54.2    2. Hypothyroidism E03.9          Assessment:   Pt meeting goals today overall.  Patient demonstrates understanding/independence with home program.  Response to Intervention good  Patient is benefitting from skilled physical therapy and is making steady progress toward functional goals.    Goal Status:  Patient Turn Head: for work;for driving;for conversation;Comment  Comment: in 8 weeks with no sharp right neck pain  Pt will: have end of day pain at work 0-10 in 8 weeks     Neck pain at end of work day yesterday was 2/10  Able to turn neck with no sharp neck pain now to the right    Plan / Patient Education:     Pt is generally meeting goals after 4 visits. She is compliant with her home program, and has attended all scheduled PT.  She was told to consider 1-2 visits after 2-3 weeks.  Check AROM and do NDI    She has not contacted us in the past 2 months so PT episode of care was discontinued.    Subjective:     Pain Ratin  No sharp neck pains when turning head to the right  No overall problems with the exercises    Objective:     Right rotation 55 degrees without sharp pain  PSIS level    Treatment Today     TREATMENT MINUTES COMMENTS   Evaluation     Self-care/ Home management     Manual therapy 55 Supine MT/SCS to neck and UB  Stacked fascial dysfunctions LF C3-T1 MS, R PGC4-T4 -N, Maulik CC-A,R SIB-V, R VERT-A   Neuromuscular Re-education     Therapeutic Activity     Therapeutic Exercises     Gait training     Modality__________________                Total 60    Blank areas are intentional and mean the treatment did not include these items.       Nancy Celis  2017

## 2021-06-09 NOTE — PROGRESS NOTES
Annual Physical    1. Well adult exam  Glucose    Lipid [place]    Hemoglobin   2. Hypothyroidism  Thyroid Stimulating Hormone (TSH)   3. Vitamin D deficiency  Vitamin D, Total (25-Hydroxy)   4. Leiomyoma Of The Uterus     5. Urethral polyp     6. Neck pain, musculoskeletal       Discussion of plan  Normal will visit tests  Surveillance of thyroid hormone and replacement levothyroxine with TSH  Surveillance of vitamin D deficiency with vitamin D lab  Examination of uterus reveals a small uterus without palpable problems in the right mid/right lower quadrant.  Surveillance of ultrasounds shows decrease in size of multiple fibroids.  Symptoms decreased.  Ultrasound discussed and we decided not needed  Urethral polyp appears stable on not grown not changed not bleeding.  Will monitor yearly.  Refer to urology should bleeding change her pain evolve.  We'll repeat bone density testing at age 63 and start with  prevnar 13 and pneumococcl vaccines    The patient recently was seen for cervical neck pain and referred to physical therapy.  She is working with a bands at home and says her symptoms have greatly improved she is plans to continue with home exercise/    Follow-up preventive care testing dates reviewed with patient    Subjective:      Luis A is a 56 y.o. female presenting to my clinic for annual physical exam.  Gloria's been in good health and does walk 3-4 times a week for exercise.  She is  has 2 children and works at the [employer].    She's had adequate preventative care testing and will have her mammogram this afternoon.  She is up-to-date with her Pap colonoscopy and DVT.  She is due for pneumococcal vaccines at age 63.  Screen for hepatitis C discussed that she does not have any risk factors.  She takes vitamin D replacement and has hypothyroidism and takes levothyroxine replacement.  Testing of both today.    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  She denies any bleeding pain or  notable change in her urethra.  She also has had leiomyomas of the uterus identified on ultrasounds dating back to 2012.  We've been following this over the years.  In 2015 at 2016 she had some right-sided abdominal pain.  We followed her ultrasound and saw last year that the uterine myomas were getting smaller she.  She does not have any vaginal bleeding.  She rarely gets the right mid abdomen tenderness to her right abdominal wall.  Ultrasound is reviewed and discussed examination today of uterus.    She reports some neck symptoms greatly improved by going to physical therapy.  She is working with home exercise and bands  Current Outpatient Prescriptions on File Prior to Visit   Medication Sig Dispense Refill     B INFANTIS/B ANI/B EUSEBIO/B BIFID (PROBIOTIC 4X ORAL) Take by mouth.       calcium-vitamin D (CALCIUM-VITAMIN D) 500 mg(1,250mg) -200 unit per tablet Take 1 tablet by mouth 2 (two) times a day with meals.       ergocalciferol (ERGOCALCIFEROL) 50,000 unit capsule One tab twice a week for 12 weeks then refill. 24 capsule 1     estradiol (ESTRACE) 0.01 % (0.1 mg/gram) vaginal cream Use 0.5 gm three times a week. (Patient taking differently: Use 0.5 gm weekly) 42.5 g 5     hydrOXYzine (ATARAX) 25 MG tablet Take 25 mg by mouth. As needed for itching       levothyroxine (SYNTHROID, LEVOTHROID) 125 MCG tablet TAKE 1 TABLET BY MOUTH DAILY. 90 tablet 0     aspirin 81 MG EC tablet Take 1 tablet (81 mg total) by mouth daily.       naproxen (NAPROSYN) 500 MG tablet Take 1 tablet (500 mg total) by mouth 2 (two) times a day with meals. 60 tablet 2     No current facility-administered medications on file prior to visit.      No Known Allergies  Past Medical History:   Diagnosis Date     Fibroids      Hypothyroidism      Vaginal atrophy      Past Surgical History:   Procedure Laterality Date     ND CYSTOURETHROSCOPY,FULGUR <0.5 CM ADRIANA      Description: Cystoscopy With Fulguration Minor Lesion (Under 5mm);  Recorded:  "12/31/2012;     Social History     Social History     Marital status:      Spouse name: N/A     Number of children: N/A     Years of education: N/A     Occupational History     Not on file.     Social History Main Topics     Smoking status: Former Smoker     Packs/day: 0.25     Years: 3.00     Quit date: 1/19/1980     Smokeless tobacco: Never Used     Alcohol use 0.6 oz/week     1 Glasses of wine per week      Comment: On ocassions     Drug use: No     Sexual activity: Not Currently     Partners: Male     Birth control/ protection: Post-menopausal     Other Topics Concern     Not on file     Social History Narrative     Family History   Problem Relation Age of Onset     Hypothyroidism Mother      Narcolepsy Mother      Heart disease Father      Asthma Sister      Hypothyroidism Sister      Breast cancer Maternal Grandmother      Breast cancer Cousin      Breast cancer Cousin      Breast cancer Cousin      Asthma Brother        Review of systems: The rest of the review of systems are negative for all systems.  12 point system reviewed, negative except for HPI items     Current allergies, medications, and problem list are all reviewed and updated in the chart.    Healthy Habits:   Regular Exercise: Yes  Healthy Diet: Yes  Dental Visits Regularly: Yes  Seat Belt: Yes   Colonoscopy: Yes  Lipid Profile: Yes  Glucose Screen: Yes  DEXA scan 2011-stable    Physical exam:  Vitals:    02/20/17 0802   BP: 122/80   Patient Site: Left Arm   Patient Position: Sitting   Cuff Size: Adult Regular   Pulse: 72   Resp: 20   Temp: 98  F (36.7  C)   TempSrc: Oral   Weight: 140 lb (63.5 kg)   Height: 5' 6\" (1.676 m)     Body mass index is 22.6 kg/(m^2). low BMI discussed    Health Maintenance reviewed:  Lipid Profile: Yes  Glucose Screen: Yes  Colonoscopy: Yes  Mammogram: Normal recheck today    Immunization History   Administered Date(s) Administered     DT (pediatric) 01/01/2004     Influenza, inj, historic 10/12/2007, " 11/06/2008, 10/01/2014, 10/01/2015, 10/01/2016     Td, historic 11/16/2009     Tdap 11/16/2009     Immunization status: up to date and documented.    Gynecologic History  No LMP recorded. Patient is postmenopausal.  Contraception: post menopausal status  Last Pap: 2015. Results were: Normal with negative HPV/repeat 2020      General Appearance:  Alert, cooperative, no distress, appears stated age   Head:  Normocephalic, without obvious abnormality, atraumatic   Eyes:  PERRL, conjunctiva/corneas clear, EOM's intact   Ears:  Normal TM's and external ear canals, both ears   Nose: Nares normal, septum midline,mucosa normal, no drainage    Throat: Lips, mucosa, and tongue normal; teeth and gums normal   Neck: Supple, symmetrical, trachea midline, no adenopathy;  thyroid: not enlarged, symmetric, no normal thyroid/still has some tenderness in the right side of her neck much improved; no carotid bruit or JVD   Back:   Symmetric, no curvature, ROM normal, mild tenderness in right side of her left much improved    Lungs:   Clear to auscultation bilaterally, respirations unlabored   Breasts:  No breast masses, tenderness, asymmetry, or nipple discharge.   Heart:  Regular rate and rhythm, S1 and S2 normal, no murmur, rub, or gallop   Abdomen:   Soft, non-tender, bowel sounds active, no masses, no organomegaly   Genitalia: Normally developed genitalia with no external lesions or eruptions.  Urinary urethra examined and evidence of carbuncle/polyp unchanged from last year.  inflammation, discharge or tenderness.  No cystocele.  Uterus normal size and position.  Uterus is a little lopsided but appears within normal limits and small.  No tender spots palpated despite known fibroids    Rectal:  No hemorrhoids   Extremities: Extremities normal, atraumatic, no cyanosis or edema   Skin: Skin color, texture, turgor normal, no rashes or lesions   Lymph nodes: Cervical, supraclavicular, and axillary nodes normal   Neurologic: Normal, CN  2-12 intact                 The following high BMI interventions were performed this visit: encouragement to exercise and weight monitoring        Kathleen Najera MD  [facility]  [phone]    [facility]  This note has been dictated using voice recognition software. Any grammatical or context distortions are unintentional and inherent to the software.

## 2021-06-09 NOTE — TELEPHONE ENCOUNTER
Patient Returning Call  Reason for call:  Lab results   Information relayed to patient:  See Alisha Hale MD's message below  Patient has additional questions:  No  If YES, what are your questions/concerns:  NA  Okay to leave a detailed message?: No call back needed

## 2021-06-09 NOTE — PROGRESS NOTES
Assessment:        1. Annual physical exam  Hemoglobin    Glucose    Lipid [place]    Gynecologic Cytology (PAP Smear)   2. Hypothyroidism  Thyroid Stimulating Hormone (TSH)   3. Need for hepatitis C screening test  Hepatitis C Antibody (Anti-HCV)   4. Encounter for screening for HIV  HIV Antigen/Antibody Screening [place]   5. Chest wall pain         Plan:      1. Healthcare maintenance: Pap smear performed today.  Mammogram was done in May 2020.  Next colonoscopy due in 2023 due to history of polyps.  DEXA scan due in 2023.  Tetanus booster administered today.  Counseled on vaccine and side effects.  Check fasting lipids, glucose and hemoglobin.  Encouraged regular exercise and healthy diet.  Follow-up in 1 year for yearly physical  2. Hypothyroidism: Continue levothyroxine.  Check TSH  3. Hep C screen performed  4. HIV screen performed  5. Chest wall pain: Recent diagnostic mammogram was negative.  She has been using supplements to help with breast pain without much benefit.  Description sounds more musculoskeletal.  Suggested that she consult with her chiropractor as next step.  6.  Dry skin near corner of nose: Continue with moisturizing cream.  May  try some OTC hydrocortisone cream.  See dermatologist if not resolving.        Subjective:      Gloria Myers is a 59 y.o. female who presents for an annual exam.  We reviewed her health history.  She was last seen for a physical in 2019.  No significant changes in her health.  Remains in good overall health.  She does have hypothyroidism and takes levothyroxine.  She has history of some anxiety and occasional panic attacks.  Uses hydroxyzine on an as-needed basis.  She has history of uterine fibroids that have previously been evaluated by ultrasound.  Has history of urethral caruncle and has previously been evaluated by [facility].  It is benign and she uses topical estrogen cream once a week and does not have any problems.  She is  and she has twin  sons.  She works at [employer] and has been there for the past 38 years.  Has continued to work amidst the COVID-19 pandemic.  On review of systems she reports that she continues to have some problems with left breast/chest wall pain.  It is burning.  Comes and goes.  She did have mammogram in May.  No abnormalities found.  Was advised to try some vitamin E and primrose oil.  This has not been helpful.  She wonders about other options.  She also reports that with walking a couple of toes on her feet will go numb.  This will resolve when she stops to rest.  She does wear arch supports and proper footwear.  She does have a little bit of flaky skin near the lower corner of the left side of her nose.  This is from wearing a mask.  She has been putting Vaseline on it.  Wonders if anything else can be used.  She will be seeing her dermatologist in the near future for yearly skin exam due to history of skin cancer.  She is up-to-date on vision and dental exams.  Review of systems otherwise negative.  No other concerns or questions.    Healthy Habits:   Regular Exercise: Yes  Sunscreen Use: Yes  Healthy Diet: Yes  Dental Visits Regularly: Yes  Seat Belt: Yes  Sexually active: Yes  Self Breast Exam Monthly:Yes  Hemoccults: N/A  Flex Sig: N/A  Colonoscopy: Yes  Lipid Profile: Yes  Glucose Screen: Yes  Prevention of Osteoporosis: Yes      Immunization History   Administered Date(s) Administered     DT (pediatric) 01/01/2004     INFLUENZA,RECOMBINANT,INJ,PF QUADRIVALENT 18+YRS 10/25/2019     Influenza, Seasonal, Inj PF IIV3 09/10/2018     Influenza, inj, historic,unspecified 10/12/2007, 11/06/2008, 10/01/2014, 10/01/2015, 10/01/2016, 10/16/2017     Influenza,seasonal, Inj IIV3 10/12/2006     Td,adult,historic,unspecified 11/16/2009     Tdap 11/16/2009     ZOSTER, RECOMBINANT, IM 10/25/2019     Immunization status: due today.      Gynecologic History  No LMP recorded. Patient is postmenopausal.  Contraception: post  menopausal status  Last Pap: . Results were: normal  Last mammogram: . Results were: normal      OB History    Para Term  AB Living   1 1 1         SAB TAB Ectopic Multiple Live Births                  # Outcome Date GA Lbr Alirio/2nd Weight Sex Delivery Anes PTL Lv   1 Term                Current Outpatient Medications   Medication Sig Dispense Refill     B INFANTIS/B ANI/B EUSEBIO/B BIFID (PROBIOTIC 4X ORAL) Take by mouth.       calcium-vitamin D (CALCIUM-VITAMIN D) 500 mg(1,250mg) -200 unit per tablet Take 1 tablet by mouth 2 (two) times a day with meals.       estradiol (ESTRACE) 0.01 % (0.1 mg/gram) vaginal cream Use 0.5 gm weekly 42.5 g 5     hydrOXYzine HCL (ATARAX) 25 MG tablet Take 1 tablet (25 mg total) by mouth every 8 (eight) hours as needed for itching. As needed for itching 30 tablet 3     levothyroxine (SYNTHROID) 125 MCG tablet Take 1 tablet (125 mcg total) by mouth daily. 90 tablet 3     aspirin 81 MG EC tablet Take 1 tablet (81 mg total) by mouth daily.       No current facility-administered medications for this visit.      Past Medical History:   Diagnosis Date     Anxiety      Fibroids      Hypothyroidism      Vaginal atrophy      Past Surgical History:   Procedure Laterality Date     IA CYSTOURETHROSCOPY,FULGUR <0.5 CM ADRIANA      Description: Cystoscopy With Fulguration Minor Lesion (Under 5mm);  Recorded: 2012;     Patient has no known allergies.  Family History   Problem Relation Age of Onset     Hypothyroidism Mother      Narcolepsy Mother      Asthma Mother      Heart disease Father      Asthma Sister      Hypothyroidism Sister      Breast cancer Maternal Grandmother      Breast cancer Cousin      Breast cancer Cousin      Breast cancer Cousin      Asthma Brother      No Medical Problems Son      No Medical Problems Son      Social History     Socioeconomic History     Marital status:      Spouse name: Not on file     Number of children: Not on file     Years of  "education: Not on file     Highest education level: Not on file   Occupational History     Not on file   Social Needs     Financial resource strain: Not on file     Food insecurity     Worry: Not on file     Inability: Not on file     Transportation needs     Medical: Not on file     Non-medical: Not on file   Tobacco Use     Smoking status: Former Smoker     Packs/day: 0.25     Years: 3.00     Pack years: 0.75     Last attempt to quit: 1980     Years since quittin.5     Smokeless tobacco: Never Used   Substance and Sexual Activity     Alcohol use: Yes     Alcohol/week: 1.0 standard drinks     Types: 1 Glasses of wine per week     Binge frequency: Weekly     Comment: On ocassions     Drug use: No     Sexual activity: Not Currently     Partners: Male     Birth control/protection: Post-menopausal   Lifestyle     Physical activity     Days per week: Not on file     Minutes per session: Not on file     Stress: Not on file   Relationships     Social connections     Talks on phone: Not on file     Gets together: Not on file     Attends [employer] service: Not on file     Active member of club or organization: Not on file     Attends meetings of clubs or organizations: Not on file     Relationship status: Not on file     Intimate partner violence     Fear of current or ex partner: Not on file     Emotionally abused: Not on file     Physically abused: Not on file     Forced sexual activity: Not on file   Other Topics Concern     Not on file   Social History Narrative    Lives .       Review of Systems    See HPI      Objective:         /80 (Patient Site: Left Arm, Patient Position: Sitting, Cuff Size: Adult Regular)   Pulse 76   Temp 98.2  F (36.8  C) (Oral)   Ht 5' 6.5\" (1.689 m)   Wt 125 lb (56.7 kg)   SpO2 97%   BMI 19.87 kg/m        Physical Exam:  General Appearance: Alert, cooperative, no distress, appears stated age  Head: Normocephalic, without obvious abnormality, atraumatic  Eyes: " PERRL, conjunctiva/corneas clear, EOM's intact  Ears: Normal TM's and external ear canals, both ears  Nose: Nares normal, septum midline,mucosa normal, no drainage  Throat: Lips, mucosa, and tongue normal; teeth and gums normal  Neck: Supple, symmetrical, trachea midline, no adenopathy;  thyroid: not enlarged, symmetric, no tenderness/mass/nodules;   Back: Symmetric, no curvature, ROM normal  Lungs: Clear to auscultation bilaterally, respirations unlabored  Breasts: No breast masses, tenderness, asymmetry, or nipple discharge.  Heart: Regular rate and rhythm, S1 and S2 normal, no murmur, rub, or gallop, Abdomen: Soft, non-tender, bowel sounds active all four quadrants,  no masses, no organomegaly  Pelvic:Normally developed genitalia with no external lesions or eruptions. Urethral caruncle present. Vagina and cervix show no lesions, inflammation, discharge or tenderness. No cystocele, No rectocele. Uterus alfredito.  No adnexal mass or tenderness.  Extremities: Extremities normal, atraumatic, no cyanosis or edema  Skin: Skin color, texture, turgor normal, no rashes or lesions, dry flaky skin near left nasolabial crease  Lymph nodes: Cervical, supraclavicular, and axillary nodes normal  Neurologic: Normal

## 2021-06-10 NOTE — PROGRESS NOTES
[facility] Discharge Summary  Patient Name: Gloria Myers  Date: 5/8/2017  Referral Diagnosis: Neck Pain  Referring provider: Kathleen Najera MD  Visit Diagnosis:   1. Cervicalgia     2. Hypothyroidism         Goals:  Patient [place]: for work;for driving;for conversation;Comment  Comment: in 8 weeks with no sharp right neck pain  Pt will: have end of day pain at work 0-4/10 in 8 weeks      Patient was seen for 4  visits from 1/31/17  to 2/24/17 with no missed appointments.    The patient met goals and has demonstrated understanding of and independence in the home program for self-care, and progression to next steps.  She will initiate contact if questions or concerns arise.    Therapy will be discontinued at this time.  The patient will need a new referral to resume.    Thank you for your referral.  Nancy Celis  5/8/2017  3:59 PM

## 2021-06-14 NOTE — PROGRESS NOTES
Assessment:     1. Anxiety            Plan:     Recommend that she could increase her hydroxyzine to 25-50 mg up to 3 times daily as needed.  Given that side he has not been a persistent problem for her, would not recommend a daily antianxiety medication at this point.  We will see if this settles down.  I did recommend that she make a follow-up appointment to establish care and for follow-up of her anxiety with a new primary care provider in the next 1-2 weeks.  If anxiety continues to be a problem, may consider an SSRI.  Is agreeable with this plan.    There are than 50% of this the minute visit was spent in counseling.    Subjective:       57 y.o. female presents for evaluation of 5 day history of anxiety.  She typically has feelings of anxiety only a few times a year for which she takes a small dose of hydroxyzine.  For the past 5 days however her anxiety has been more persistent and she is taking hydroxyzine 25 mg once daily.  She does not know any triggers for this anxiety and does not feel that she has been under any more stress than usual.  She is wondering what she should do.  Her primary care provider has just retired and she has not yet established care with anyone else.    The following portions of the patient's history were reviewed and updated as appropriate: allergies, current medications, past family history, past medical history, past social history, past surgical history and problem list.    Review of Systems  A 12 point comprehensive review of systems was negative except as noted.     Objective:      /80  Pulse 80  Temp 97.5  F (36.4  C) (Oral)   Resp 12  Wt 140 lb (63.5 kg)  SpO2 97%  Breastfeeding? No  BMI 22.6 kg/m2  General appearance: alert, appears stated age and cooperative, mildly anxious appearing.    This note has been dictated using voice recognition software. Any grammatical or context distortions are unintentional and inherent to the software

## 2021-06-14 NOTE — PATIENT INSTRUCTIONS - HE
Continue to ice and wear supportive bra.    Try topical diclofenac gel    Let me know if pain is not improving in a couple more weeks.

## 2021-06-14 NOTE — PROGRESS NOTES
Chief Complaint   Patient presents with     Leg Injury     hit leg 12/11 and still has a large bruise and bump       HPI    Patient is here for bruising to right lower leg started 12/11 after she bumped her right leg against a chair. No pain unless palpated. No pain with regular walking. No fever, chills.     ROS: Pertinent ROS noted in HPI.     No Known Allergies    Patient Active Problem List   Diagnosis     Hypothyroidism     Leiomyoma Of The Uterus       Family History   Problem Relation Age of Onset     Hypothyroidism Mother      Narcolepsy Mother      Heart disease Father      Asthma Sister      Hypothyroidism Sister      Breast cancer Maternal Grandmother      Breast cancer Cousin      Breast cancer Cousin      Breast cancer Cousin      Asthma Brother        Social History     Social History     Marital status:      Spouse name: N/A     Number of children: N/A     Years of education: N/A     Occupational History     Not on file.     Social History Main Topics     Smoking status: Former Smoker     Packs/day: 0.25     Years: 3.00     Quit date: 1/19/1980     Smokeless tobacco: Never Used     Alcohol use 0.6 oz/week     1 Glasses of wine per week      Comment: On ocassions     Drug use: No     Sexual activity: Not Currently     Partners: Male     Birth control/ protection: Post-menopausal     Other Topics Concern     Not on file     Social History Narrative           Exam: eccymosis 6 cm x 6 cm proximal medial right lwoer leg, with bruising spreading into right anterior lower leg. noram ROM strength, and gait.      Objective:    Vitals:    12/19/17 1315   BP: 124/86   Pulse: 86   Resp: 16   Temp: 98.5  F (36.9  C)   SpO2: 97%       Gen: NAD  MSK: 6 cm x 6 cm ecchymosis right medial proximal lower leg extending to the anterior aspect without slight edema without fluctuance, warmth, pain to palpation. No erythema. No evidence of cellulitis. Full ROM and strength of right knee and ankle. Normal gait.      Impression:    Traumatic ecchymosis of right lower leg    Plan:    Cold packs as directed.  F/u as directed.     normal... Well appearing, awake, alert, oriented to person, place, time/situation and in no apparent distress.

## 2021-06-14 NOTE — PROGRESS NOTES
Assessment & Plan     Gloria was seen today for left breast pain.    Diagnoses and all orders for this visit:    Left-sided chest wall pain  -     XR Ribs Left W PA Chest    Breast pain, left    Examination shows that her pain is more associated with the left anterior chest wall rather than her breast.  At this time, it is recommended that she continue to ice and wear supportive bra.  We will try some topical diclofenac gel.  Prescription for this was sent to pharmacy.  She was counseled on use of this medication.  Advised her to avoid heavy lifting and repetitive activities involving her arms and shoulders.  Advised her to contact me if discomfort is not improving over the next couple of weeks for further recommendations.  [id number]       No follow-ups on file.    Alisah Hale MD  [facility]     Gloria Myers is 60 y.o. and presents to clinic today for the following health issues   HPI   She is here today discuss left breast pain.  She had an episode of left breast pain that started in April 2020.  Came on suddenly.  Did not have any associated redness, swelling or lumps in the breast.  She underwent a diagnostic mammogram and ultrasound in early May.  This showed a small breast cyst but this was not felt to be the cause of her discomfort.  She was advised on some techniques for helping to manage the breast pain.  She has been taking vitamin E and primrose oil at night and sleeping in her bra.  She has been cutting back on her caffeine.  She reports that the breast pain went away fairly abruptly and she did not have any further issues until a few weeks ago.  In early January she started experiencing the pain in the left breast again.  She reports no recent heavy lifting or injuries to the chest wall.  She has not noticed any redness, warmth or change in the appearance of the breasts.  She does not notice any lumps or nipple discharge.  She reports that it feels pretty good first  thing in the morning but tends to become more bothersome during the day when she is up and about.  She pretty much has an ice pack in her bra at all times.  She will occasionally take ibuprofen but then she gets anxious about taking ibuprofen.  She has not tried any topical pain creams.  She is otherwise feeling well.  She has not noticed any significant changes in her weight.  No weight loss.  No fatigue.  No other symptoms of concern.    Review of Systems   All other systems reviewed and are negative.          Objective    /88 (Patient Site: Left Arm, Patient Position: Sitting, Cuff Size: Adult Regular)   Pulse 97   Temp 98.6  F (37  C) (Temporal)   Wt 130 lb 8 oz (59.2 kg)   SpO2 99%   BMI 20.75 kg/m    Body mass index is 20.75 kg/m .  Physical Exam   Constitutional: She appears well-developed and well-nourished.     Results: X-ray of chest and ribs was performed in clinic.  This was personally reviewed.  Per my read it was negative.

## 2021-06-16 PROBLEM — Z85.828 HISTORY OF SQUAMOUS CELL CARCINOMA OF SKIN: Status: ACTIVE | Noted: 2019-03-01

## 2021-06-16 NOTE — PROGRESS NOTES
Assessment:        1. Routine general medical examination at a health care facility  Lipid Cascade FASTING    Glucose    Hemoglobin   2. Hypothyroidism  Thyroid Stimulating Hormone (TSH)   3. Visit for screening mammogram  Mammo Screening Bilateral   4. Neoplasm of uncertain behavior of skin  Ambulatory referral to Dermatology   5. Skin cancer screening  Ambulatory referral to Dermatology   6. Osteoporosis screening  DXA Bone Density Scan   7. History of vitamin D deficiency  Vitamin D, Total (25-Hydroxy)   8. Urethral caruncle         Plan:      1. Healthcare maintenance: Order placed for 3D mammogram.  Pap smear is up-to-date. Next pap Due in 2020.  Vaccines up-to-date.  Discussed she will be due for colonoscopy in December 2018.  We will check fasting lipids, glucose and hemoglobin today.  Encouraged regular exercise, healthy diet and adequate calcium intake.  2. Hypothyroidism: Check TSH today.  Continue levothyroxine.  3. Skin neoplasm of uncertain behavior: I am concerned about possible skin cancer.  Referral placed to dermatology for further evaluation of this lesion as well as overall skin cancer screening.  4. Osteoporosis screening: Order placed for bone density scan.  She does have risk factors of hypothyroidism and postmenopausal status.  5. History of vitamin D deficiency: Check vitamin D level.  She is currently taking a vitamin D supplement.  6. Urethral caruncle: [facility]  This has been evaluated and is benign.  Continue topical estrogen cream to prevent worsening of caruncle.          Subjective:      Gloria Myers is a 57 y.o. female who presents for an annual exam.  She was previously followed by Dr. Najera.  We reviewed her health history.  She is a healthy individual overall.  She does have hypothyroidism and takes levothyroxine.  She has history of some anxiety and will occasionally have panic attacks.  Uses hydroxyzine on an as-needed basis.  Typically will use  this a couple of times a year.  She has history of uterine fibroids.  These have previously been evaluated on ultrasound.  Most recent ultrasound was in 2016 and showed some decrease in size of the fibroids.  She has a urethral caruncle that has been evaluated by [facility] urology.  It is benign.  Reviewed last note from urologist in 2015.  This suggested as needed follow-up and to continue with topical estradiol cream to prevent worsening of the caruncle.  She is  and she has twin sons.  She works at [employer] and has been there for the past 36 years.  She did receive influenza vaccine this year.  There is family history of breast cancer and she has history of dense breast tissue.  She is interested in a 3D mammogram this year.  She has had bone density scan about 10 years ago.  She eats a healthy diet and gets calcium through a supplement.  Admits that she does not exercise as much as she probably should.  On review of systems she reports concern about a growth on her right hand that is causing her discomfort.  It has been present for a few months.  She is keeping it covered with a bandage and some Neosporin.  Review of systems is assessed and is otherwise negative.  Medications and allergies are reviewed along with family and social history.    Healthy Habits:   Regular Exercise: Yes  Sunscreen Use: Yes  Healthy Diet: Yes  Dental Visits Regularly: Yes  Seat Belt: Yes  Sexually active: Yes  Self Breast Exam Monthly:Yes  Hemoccults: N/A  Flex Sig: N/A  Colonoscopy: Yes  Lipid Profile: Yes  Glucose Screen: Yes  Prevention of Osteoporosis: Yes  Last Dexa: several years ago  Guns at Home:  N/A      Immunization History   Administered Date(s) Administered     DT (pediatric) 01/01/2004     Influenza, inj, historic,unspecified 10/12/2007, 11/06/2008, 10/01/2014, 10/01/2015, 10/01/2016     Td,adult,historic,unspecified 11/16/2009     Tdap 11/16/2009     Immunization status: up to date and  documented.      Gynecologic History  No LMP recorded. Patient is postmenopausal.  Contraception: post menopausal status  Last Pap: . Results were: normal  Last mammogram: . Results were: normal      OB History    Para Term  AB Living   1 1 1      SAB TAB Ectopic Multiple Live Births             # Outcome Date GA Lbr Alirio/2nd Weight Sex Delivery Anes PTL Lv   1 Term                   Current Outpatient Prescriptions   Medication Sig Dispense Refill     acetaminophen (TYLENOL) 325 MG tablet Take 650 mg by mouth every 6 (six) hours as needed for pain.       aspirin 81 MG EC tablet Take 1 tablet (81 mg total) by mouth daily.       B INFANTIS/B ANI/B EUSEBIO/B BIFID (PROBIOTIC 4X ORAL) Take by mouth.       calcium-vitamin D (CALCIUM-VITAMIN D) 500 mg(1,250mg) -200 unit per tablet Take 1 tablet by mouth 2 (two) times a day with meals.       estradiol (ESTRACE) 0.01 % (0.1 mg/gram) vaginal cream Use 0.5 gm three times a week. (Patient taking differently: Use 0.5 gm weekly) 42.5 g 5     hydrOXYzine (ATARAX) 25 MG tablet Take 25 mg by mouth. As needed for itching       levothyroxine (SYNTHROID, LEVOTHROID) 125 MCG tablet TAKE 1 TABLET BY MOUTH DAILY. 90 tablet 1     No current facility-administered medications for this visit.      Past Medical History:   Diagnosis Date     Fibroids      Hypothyroidism      Vaginal atrophy      Past Surgical History:   Procedure Laterality Date     [place] CYSTOURETHROSCOPY,FULGUR <0.5 CM ADRIANA      Description: Cystoscopy With Fulguration Minor Lesion (Under 5mm);  Recorded: 2012;     Review of patient's allergies indicates no known allergies.  Family History   Problem Relation Age of Onset     Hypothyroidism Mother      Narcolepsy Mother      Heart disease Father      Asthma Sister      Hypothyroidism Sister      Breast cancer Maternal Grandmother      Breast cancer Cousin      Breast cancer Cousin      Breast cancer Cousin      Asthma Brother      No Medical Problems Son   "    No Medical Problems Son      Social History     Social History     Marital status:      Spouse name: N/A     Number of children: N/A     Years of education: N/A     Occupational History     Not on file.     Social History Main Topics     Smoking status: Former Smoker     Packs/day: 0.25     Years: 3.00     Quit date: 1/19/1980     Smokeless tobacco: Never Used     Alcohol use 0.6 oz/week     1 Glasses of wine per week      Comment: On ocassions     Drug use: No     Sexual activity: Not Currently     Partners: Male     Birth control/ protection: Post-menopausal     Other Topics Concern     Not on file     Social History Narrative       Review of Systems    See HPI      Objective:         /80 (Patient Site: Right Arm, Patient Position: Sitting, Cuff Size: Adult Regular)  Pulse 75  Temp 98.7  F (37.1  C) (Tympanic)   Ht 5' 7\" (1.702 m)  Wt 141 lb 4 oz (64.1 kg)  SpO2 98%  BMI 22.12 kg/m2      Physical Exam:  General Appearance: Alert, cooperative, no distress, appears stated age  Head: Normocephalic, without obvious abnormality, atraumatic  Eyes: PERRL, conjunctiva/corneas clear, EOM's intact  Ears: Normal TM's and external ear canals, both ears  Nose: Nares normal, septum midline,mucosa normal, no drainage  Throat: Lips, mucosa, and tongue normal; teeth and gums normal  Neck: Supple, symmetrical, trachea midline, no adenopathy;  thyroid: not enlarged, symmetric, no tenderness/mass/nodules;   Back: Symmetric, no curvature, ROM normal,  Lungs: Clear to auscultation bilaterally, respirations unlabored  Breasts: No breast masses, tenderness, asymmetry, or nipple discharge.  Heart: Regular rate and rhythm, S1 and S2 normal, no murmur, rub, or gallop, Abdomen: Soft, non-tender, bowel sounds active all four quadrants,  no masses, no organomegaly  Pelvic:Normally developed genitalia. Urethral carbuncle present  Extremities: Extremities normal, atraumatic, no cyanosis or edema  Skin: Skin color, texture, " turgor normal, no rashes.  There is a raised nodular lesion present on the dorsum of the right hand with some centralized necrosis  Lymph nodes: Cervical, supraclavicular, and axillary nodes normal  Neurologic: Normal

## 2021-06-16 NOTE — TELEPHONE ENCOUNTER
RN cannot approve Refill Request    RN can NOT refill this medication RX was sent on 02/16/2021 for a 1 year script. Last office visit: 1/29/2021 Alisha Hale MD Last Physical: 7/10/2020 Last MTM visit: Visit date not found Last visit same specialty: 1/29/2021 Alisha Hale MD.  Next visit within 3 mo: Visit date not found  Next physical within 3 mo: Visit date not found      Jessie Gray, [facility] Triage/Med Refill 3/22/2021    Requested Prescriptions   Pending Prescriptions Disp Refills     levothyroxine (SYNTHROID, LEVOTHROID) 125 MCG tablet [Pharmacy Med Name: LEVOTHYROXINE 125 MCG TABLET] 48 tablet 0     Sig: PLEASE SEE ATTACHED FOR DETAILED DIRECTIONS       Thyroid Hormones Protocol Passed - 3/22/2021 12:34 AM        Passed - Provider visit in past 12 months or next 3 months     Last office visit with prescriber/PCP: 1/29/2021 Alsiha Hale MD OR same dept: 1/29/2021 Alisha Hale MD OR same specialty: 1/29/2021 Alisha Hale MD  Last physical: 7/10/2020 Last MTM visit: Visit date not found   Next visit within 3 mo: Visit date not found  Next physical within 3 mo: Visit date not found  Prescriber OR PCP: Alisha Hale MD  Last diagnosis associated with med order: 1. Acquired hypothyroidism  - levothyroxine (SYNTHROID, LEVOTHROID) 125 MCG tablet [Pharmacy Med Name: LEVOTHYROXINE 125 MCG TABLET]; PLEASE SEE ATTACHED FOR DETAILED DIRECTIONS  Dispense: 48 tablet; Refill: 0    If protocol passes may refill for 12 months if within 3 months of last provider visit (or a total of 15 months).             Passed - TSH on file in past 12 months for patient age 12 & older     TSH   Date Value Ref Range Status   02/15/2021 0.05 (L) 0.30 - 5.00 uIU/mL Final

## 2021-06-18 NOTE — PROGRESS NOTES
Assessment/Plan:     1. Open wound of knee, leg, and ankle  Ambulatory referral to Wound Clinic   2. Dog bite         Diagnoses and all orders for this visit:    Open wound of knee, leg, and ankle  -     Ambulatory referral to Wound Clinic  -Discussed my concern that she has showed little progress in wound healing since initial injury occurred about 2 weeks ago.  At this point I would recommend referral to wound clinic.  She is agreeable to that.  Referral is placed.  In the meantime she will continue with current wound cares including antibiotic.  She will notify us if any new concerning symptoms develop.  She is comfortable with this plan.    Dog bite           Subjective:      Gloria Myers is a 57 y.o. female who comes in today to follow-up on wound of left calf due to dog bite.  She was seen in clinic for this on Monday.  Had been seen in the emergency room initially after the injury about 2 weeks ago.  Had wound cleaned in the emergency room and was placed on Augmentin to help prevent infection.  When seen in clinic several days ago, she continued to have open wound and erythema with drainage of serosanguineous fluid.  She was advised to continue with oral antibiotics and prescription for Augmentin was sent to pharmacy and she was asked to return today for follow-up.  She reports that she really has not noticed any change in the appearance of the wound.  It continues to be tender to palpation.  Bruising around the wound continues to resolve.  She denies any new symptoms of concern such as fevers or chills, muscle aches or increased fatigue.  She has no other concerns or questions.  Review of systems otherwise negative    Current Outpatient Prescriptions   Medication Sig Dispense Refill     acetaminophen (TYLENOL) 325 MG tablet Take 650 mg by mouth every 6 (six) hours as needed for pain.       amoxicillin-clavulanate (AUGMENTIN) 875-125 mg per tablet Take 1 tablet by mouth 2 (two) times a day for 7 days. 14  tablet 0     aspirin 81 MG EC tablet Take 1 tablet (81 mg total) by mouth daily.       B INFANTIS/B ANI/B EUSEBIO/B BIFID (PROBIOTIC 4X ORAL) Take by mouth.       calcium-vitamin D (CALCIUM-VITAMIN D) 500 mg(1,250mg) -200 unit per tablet Take 1 tablet by mouth 2 (two) times a day with meals.       estradiol (ESTRACE) 0.01 % (0.1 mg/gram) vaginal cream Use 0.5 gm three times a week. (Patient taking differently: Use 0.5 gm weekly) 42.5 g 5     hydrOXYzine (ATARAX) 25 MG tablet Take 25 mg by mouth. As needed for itching       levothyroxine (SYNTHROID) 112 MCG tablet Take 1 tablet (112 mcg total) by mouth daily. 90 tablet 0     No current facility-administered medications for this visit.        Past Medical History, Family History, and Social History reviewed.  Past Medical History:   Diagnosis Date     Fibroids      Hypothyroidism      Vaginal atrophy      Past Surgical History:   Procedure Laterality Date     [place] CYSTOURETHROSCOPY,FULGUR <0.5 CM ADRIANA      Description: Cystoscopy With Fulguration Minor Lesion (Under 5mm);  Recorded: 12/31/2012;     Review of patient's allergies indicates no known allergies.  Family History   Problem Relation Age of Onset     Hypothyroidism Mother      Narcolepsy Mother      Heart disease Father      Asthma Sister      Hypothyroidism Sister      Breast cancer Maternal Grandmother      Breast cancer Cousin      Breast cancer Cousin      Breast cancer Cousin      Asthma Brother      No Medical Problems Son      No Medical Problems Son      Social History     Social History     Marital status:      Spouse name: N/A     Number of children: N/A     Years of education: N/A     Occupational History     Not on file.     Social History Main Topics     Smoking status: Former Smoker     Packs/day: 0.25     Years: 3.00     Quit date: 1/19/1980     Smokeless tobacco: Never Used     Alcohol use 0.6 oz/week     1 Glasses of wine per week      Comment: On ocassions     Drug use: No     Sexual  activity: Not Currently     Partners: Male     Birth control/ protection: Post-menopausal     Other Topics Concern     Not on file     Social History Narrative         Review of systems is as stated in HPI, and the remainder of the 10 system review is otherwise negative.    Objective:     Vitals:    06/15/18 1402   BP: 120/70   Patient Site: Right Arm   Patient Position: Sitting   Cuff Size: Adult Regular   Pulse: 83   Temp: 97.8  F (36.6  C)   TempSrc: Tympanic   SpO2: 96%   Weight: 139 lb 5 oz (63.2 kg)    Body mass index is 21.82 kg/(m^2).    General appearance: alert, appears stated age and cooperative  Extremities: Dog bite left posterior calf appears similar appearance to Monday with small amount of serosanguineous drainage, associated tenderness with palpation and bruising around the wound        This note has been dictated using voice recognition software. Any grammatical or context distortions are unintentional and inherent to the the software.

## 2021-06-18 NOTE — PROGRESS NOTES
Assessment/Plan:     1. Dog bite  Rabies Antibody Endpoint    HM2(CBC w/o Differential)   2. Hypothyroidism  Thyroid Stimulating Hormone (TSH)       Diagnoses and all orders for this visit:    Dog bite  -     Rabies Antibody Endpoint  -     HM2(CBC w/o Differential)  -The dog that bit her was not vaccinated against rabies.  She reports that the dog is being watched.  We will obtain rabies antibody today.  Suggest a recheck of this in a few months.  We will also check hemogram.  I am concerned about persistent redness at the site of the dog bite.  I would recommend she continue on Augmentin for another week.  Refill at this location is provided and she is counseled on use of the medication.  I would remain she discontinue topical antibiotic ointment and switch to Vaseline ointment instead.  We will have her follow-up again later this week for a recheck of the wound on Friday      Hypothyroidism  -     Thyroid Stimulating Hormone (TSH)  -Continue levothyroxine    Other orders  -     amoxicillin-clavulanate (AUGMENTIN) 875-125 mg per tablet; Take 1 tablet by mouth 2 (two) times a day for 7 days.  Dispense: 14 tablet; Refill: 0               Subjective:      Gloria Myers is a 57 y.o. female who comes in today for emergency room follow-up.  She was seen at the emergency room on June 1, 2018 due to a dog bite.  She sustained a bite to the left calf.  She was walking out the door with her family when a dog her neighbor was looking after ran towards her with the leash on.  The dog bit her on the right hip and on the left calf.  She did contact the police.  They have found that the dog was not vaccinated against rabies.  They are monitoring the dog for any concerning symptoms.  Patient presented to the emergency room for evaluation.  She had a dog bite and bruising of the left calf.  The wound was cleaned and she was placed on Augmentin for prophylaxis against infection.  She reports that she did have some diarrhea  couple of days ago that she thinks is related to the antibiotics.  She does take a probiotics of point daily.  She was advised to follow-up for a recheck with her primary care provider in 1 week.  Today she reports that the wound seems to have finally closed over.  Is no longer draining.  Previously was draining a serosanguineous fluid.  She has had increased bruising to the left posterior calf but it is now starting to clear.  She has been applying topical antibiotic ointment.  She has not had fevers or chills, increased body aches or other concerns for infection.  She would like labs today to follow-up on a change in her levothyroxine that was made several months ago.  We reviewed medical allergies.  She has no other concerns or questions today.    Current Outpatient Prescriptions   Medication Sig Dispense Refill     acetaminophen (TYLENOL) 325 MG tablet Take 650 mg by mouth every 6 (six) hours as needed for pain.       aspirin 81 MG EC tablet Take 1 tablet (81 mg total) by mouth daily.       B INFANTIS/B ANI/B EUSEBIO/B BIFID (PROBIOTIC 4X ORAL) Take by mouth.       calcium-vitamin D (CALCIUM-VITAMIN D) 500 mg(1,250mg) -200 unit per tablet Take 1 tablet by mouth 2 (two) times a day with meals.       estradiol (ESTRACE) 0.01 % (0.1 mg/gram) vaginal cream Use 0.5 gm three times a week. (Patient taking differently: Use 0.5 gm weekly) 42.5 g 5     hydrOXYzine (ATARAX) 25 MG tablet Take 25 mg by mouth. As needed for itching       levothyroxine (SYNTHROID) 112 MCG tablet Take 1 tablet (112 mcg total) by mouth daily. 90 tablet 0     amoxicillin-clavulanate (AUGMENTIN) 875-125 mg per tablet Take 1 tablet by mouth 2 (two) times a day for 7 days. 14 tablet 0     No current facility-administered medications for this visit.        Past Medical History, Family History, and Social History reviewed.  Past Medical History:   Diagnosis Date     Fibroids      Hypothyroidism      Vaginal atrophy      Past Surgical History:   Procedure  Laterality Date     [place] CYSTOURETHROSCOPY,FULGUR <0.5 CM ADRIANA      Description: Cystoscopy With Fulguration Minor Lesion (Under 5mm);  Recorded: 12/31/2012;     Review of patient's allergies indicates no known allergies.  Family History   Problem Relation Age of Onset     Hypothyroidism Mother      Narcolepsy Mother      Heart disease Father      Asthma Sister      Hypothyroidism Sister      Breast cancer Maternal Grandmother      Breast cancer Cousin      Breast cancer Cousin      Breast cancer Cousin      Asthma Brother      No Medical Problems Son      No Medical Problems Son      Social History     Social History     Marital status:      Spouse name: N/A     Number of children: N/A     Years of education: N/A     Occupational History     Not on file.     Social History Main Topics     Smoking status: Former Smoker     Packs/day: 0.25     Years: 3.00     Quit date: 1/19/1980     Smokeless tobacco: Never Used     Alcohol use 0.6 oz/week     1 Glasses of wine per week      Comment: On ocassions     Drug use: No     Sexual activity: Not Currently     Partners: Male     Birth control/ protection: Post-menopausal     Other Topics Concern     Not on file     Social History Narrative         Review of systems is as stated in HPI, and the remainder of the 10 system review is otherwise negative.    Objective:     Vitals:    06/11/18 1101   BP: 130/76   Patient Site: Right Arm   Patient Position: Sitting   Cuff Size: Adult Regular   Pulse: 89   Temp: 97.2  F (36.2  C)   TempSrc: Tympanic   SpO2: 96%   Weight: 138 lb 1 oz (62.6 kg)    Body mass index is 21.62 kg/(m^2).    General appearance: alert, appears stated age and cooperative  Extremities: .  On Posterior aspect of left calf is large puncture wound with associated erythema of the skin.  No purulent drainage noted.  There is no surrounding warmth or induration.  The wound is appropriately tender to palpation.  No fluctuance present.  There is large amount of  bruising extending in a circular rim around the wound.        This note has been dictated using voice recognition software. Any grammatical or context distortions are unintentional and inherent to the the software.

## 2021-06-19 NOTE — PROGRESS NOTES
Date of Service:8/2/2018    Provider's initial consult visit:  7/5/2018    Chief Complaint:   Chief Complaint   Patient presents with     Wound Check       History:   Patient presents to clinic in regards to her leg ulcer.  The  patient was last seen by me on 7/5/2018.  On June 1, she was bit by her neighbor's dog and her ulcer stalled.  She was started on equal parts of Bactroban and Gentamycin.  She has continued to apply it on her ulcer, but has not had any drainage for the past couple of weeks.  She is not having any pain in the ulcer.    Current Outpatient Prescriptions   Medication Sig Dispense Refill     acetaminophen (TYLENOL) 325 MG tablet Take 650 mg by mouth every 6 (six) hours as needed for pain.       aspirin 81 MG EC tablet Take 1 tablet (81 mg total) by mouth daily.       B INFANTIS/B ANI/B EUSEBIO/B BIFID (PROBIOTIC 4X ORAL) Take by mouth.       calcium-vitamin D (CALCIUM-VITAMIN D) 500 mg(1,250mg) -200 unit per tablet Take 1 tablet by mouth 2 (two) times a day with meals.       estradiol (ESTRACE) 0.01 % (0.1 mg/gram) vaginal cream Use 0.5 gm three times a week. (Patient taking differently: Use 0.5 gm weekly) 42.5 g 5     gentamicin (GARAMYCIN) 0.1 % ointment Apply as instructed to ulcer daily 30 g 1     hydrOXYzine (ATARAX) 25 MG tablet Take 25 mg by mouth. As needed for itching       levothyroxine (SYNTHROID, LEVOTHROID) 112 MCG tablet TAKE 1 TABLET (112 MCG TOTAL) BY MOUTH DAILY. 90 tablet 2     mupirocin (BACTROBAN) 2 % ointment Apply as instructed to ulcer daily 30 g 1     Current Facility-Administered Medications   Medication Dose Route Frequency Provider Last Rate Last Dose     lidocaine 2 % jelly (XYLOCAINE)   Topical PRN Ammy Vance, ANA           No Known Allergies    Physical Exam:    Vitals:    08/02/18 0716   BP: 112/62   Pulse: 64   Temp: 97.7  F (36.5  C)    There is no height or weight on file to calculate BMI.    General:  57 y.o. female in no apparent distress.    Head:   Normocephalic atraumatic  Psychiatric: Cooperative.   Respiratory: unlabored breathing.  Cardiovascular-Lower extremity: Edema: none;     Integumentary:      Left leg Ulceration(s): Resolved.     Assessment:    Images: none pertinent    Labs:    The following labs were reviewed by me today.    Lab Results   Component Value Date    WBC 5.4 06/11/2018    HGB 13.3 06/11/2018    HCT 39.8 06/11/2018    MCV 95 06/11/2018     06/11/2018               Invalid input(s): EOSPC    No results found for: CREATININE      No results found for: BUN  No results found for: PREALBUMINESUFAST(LABPROT,LABALBU,albumin)@  Invalid input(s): LABALBU  No results found for: PREALBUMIN    No results found for: CRP  No results found for: SEDRATE    No results found for: HGBA1C  Lab Results   Component Value Date    TSH 1.41 06/11/2018           Vitamin D, Total (25-Hydroxy)   Date Value Ref Range Status   02/22/2018 35.2 30.0 - 80.0 ng/mL Final     No results found for: BNP     1. Bite wound         A new wound was identified today: no.    Plan:  1.  Bite wound, resolved     2. Treatment:   No dressings are needed.    3.  Patient will follow up with me prn.        Ammy Vance, APRN, CNP,  CWCN  [facility] [place]  [phone]

## 2021-06-20 ENCOUNTER — HEALTH MAINTENANCE LETTER (OUTPATIENT)
Age: 61
End: 2021-06-20

## 2021-06-24 NOTE — PROGRESS NOTES
Assessment:        1. Routine general medical examination at a health care facility  Lipid [facility] FASTING    Glucose    Hemoglobin   2. Acquired hypothyroidism  Thyroid Stimulating Hormone (TSH)   3. Right calf pain  US Venous Leg Right   4. Visit for screening mammogram  Mammo Screening Bilateral   5. Urethral caruncle     6. History of squamous cell carcinoma of skin         Plan:      1. Healthcare maintenance: Order placed for 3D mammogram.  Pap smear up-to-date.  Due for Pap in 2020.  Colonoscopy up-to-date and next due in 2023.  Vaccines up-to-date.  She is interested in the new shingles vaccine.  She will check with insurance on coverage.  We will check lipids, glucose and hemoglobin today.  Encouraged regular exercise, healthy diet and adequate calcium intake.  Follow-up in 1 year for yearly physical  2. Hypothyroidism: Check TSH.  Continue levothyroxine  3. Right calf pain: We will obtain ultrasound of right lower extremity to rule out DVT  4. Urethral caruncle: She will continue topical estrogen cream to prevent worsening of caruncle  5. History of squamous cell carcinoma of skin: She will schedule yearly follow-up with dermatologist.  She will continue to use sunscreen on a daily basis.          Subjective:      Gloria Myers is a 58 y.o. female who presents for an annual exam.  We reviewed her health history.  Remains a healthy individual overall.  She has hypothyroidism and takes levothyroxine.  She has history of some anxiety and will occasionally attacks.  Uses hydroxyzine on an as-needed basis.  Typically will use this a couple times a year.  She has history of uterine fibroids.  These have previously been evaluated on ultrasound.  Most recent ultrasound was in 2018 and showed some decrease in size of the fibroids.  She will occasionally get some discomfort in her right waist and right upper abdomen that she attributes to the fibroids.  She has a urethral caruncle that has been evaluated by [facility] in the past.  It is benign.  She continues to use topical estradiol cream to prevent worsening of the conchal.  She reports that she has no concerns during this.  She did see dermatologist last year for concern about a lesion on her right hand.  This turned out to be a squamous cell carcinoma.  She is planning to follow-up with dermatologist for full body skin check.  She tries to be consistent with use of sunscreen.  She is  and has twin sons.  Works at [employer].  She is up-to-date on vaccines.  There is family history of breast cancer and she has history of dense breast tissue.  Had a 3D mammogram last year and would like to do 3D mammogram this year.  Has no new concerns regarding her breasts.  She had normal bone density scan last year.  She takes a calcium supplement and follows a healthy diet.  Admits she does not exercise as much as she probably should.  On review of systems she reports some concern about a burning discomfort in her right calf.  This is not associated with any redness, warmth or other discoloration.  She is not sure if this could be a sign of a blood clot.  She has not recently been on any long car rides or airplane trips.  She is not expensing any shortness of breath, chest pain or discomfort.  Review of systems is otherwise negative.  No other concerns today.    Healthy Habits:   Regular Exercise: Yes  Sunscreen Use: Yes  Healthy Diet: Yes  Dental Visits Regularly: Yes  Seat Belt: Yes  Sexually active: Yes  Self Breast Exam Monthly:Yes  Hemoccults: N/A  Flex Sig: N/A  Colonoscopy: Yes  Lipid Profile: Yes  Glucose Screen: Yes  Prevention of Osteoporosis: Yes  Last Dexa: 2018--normal        Immunization History   Administered Date(s) Administered     DT (pediatric) 01/01/2004     Influenza, Seasonal, Inj PF IIV3 09/10/2018     Influenza, inj, historic,unspecified 10/12/2007, 11/06/2008, 10/01/2014, 10/01/2015, 10/01/2016, 10/16/2017     Td,adult,historic,unspecified  2009     Tdap 2009     Immunization status: up to date and documented.      Gynecologic History  No LMP recorded. Patient is postmenopausal.  Contraception: post menopausal status  Last Pap: . Results were: normal  Last mammogram: . Results were: normal      OB History    Para Term  AB Living   1 1 1         SAB TAB Ectopic Multiple Live Births                  # Outcome Date GA Lbr Alirio/2nd Weight Sex Delivery Anes PTL Lv   1 Term                   Current Outpatient Medications   Medication Sig Dispense Refill     acetaminophen (TYLENOL) 325 MG tablet Take 650 mg by mouth every 6 (six) hours as needed for pain.       aspirin 81 MG EC tablet Take 1 tablet (81 mg total) by mouth daily.       B INFANTIS/B ANI/B EUSEBIO/B BIFID (PROBIOTIC 4X ORAL) Take by mouth.       calcium-vitamin D (CALCIUM-VITAMIN D) 500 mg(1,250mg) -200 unit per tablet Take 1 tablet by mouth 2 (two) times a day with meals.       estradiol (ESTRACE) 0.01 % (0.1 mg/gram) vaginal cream Use 0.5 gm weekly 42.5 g 5     hydrOXYzine (ATARAX) 25 MG tablet Take 25 mg by mouth. As needed for itching       levothyroxine (SYNTHROID, LEVOTHROID) 112 MCG tablet TAKE 1 TABLET (112 MCG TOTAL) BY MOUTH DAILY. 90 tablet 2     No current facility-administered medications for this visit.      Past Medical History:   Diagnosis Date     Anxiety      Fibroids      Hypothyroidism      Vaginal atrophy      Past Surgical History:   Procedure Laterality Date     ME CYSTOURETHROSCOPY,FULGUR <0.5 CM ADRIANA      Description: Cystoscopy With Fulguration Minor Lesion (Under 5mm);  Recorded: 2012;     Patient has no known allergies.  Family History   Problem Relation Age of Onset     Hypothyroidism Mother      Narcolepsy Mother      Heart disease Father      Asthma Sister      Hypothyroidism Sister      Breast cancer Maternal Grandmother      Breast cancer Cousin      Breast cancer Cousin      Breast cancer Cousin      Asthma Brother      No  "Medical Problems Son      No Medical Problems Son      Social History     Socioeconomic History     Marital status:      Spouse name: Not on file     Number of children: Not on file     Years of education: Not on file     Highest education level: Not on file   Occupational History     Not on file   Social Needs     Financial resource strain: Not on file     Food insecurity:     Worry: Not on file     Inability: Not on file     Transportation needs:     Medical: Not on file     Non-medical: Not on file   Tobacco Use     Smoking status: Former Smoker     Packs/day: 0.25     Years: 3.00     Pack years: 0.75     Last attempt to quit: 1980     Years since quittin.1     Smokeless tobacco: Never Used   Substance and Sexual Activity     Alcohol use: Yes     Alcohol/week: 0.6 oz     Types: 1 Glasses of wine per week     Comment: On ocassions     Drug use: No     Sexual activity: Not Currently     Partners: Male     Birth control/protection: Post-menopausal   Lifestyle     Physical activity:     Days per week: Not on file     Minutes per session: Not on file     Stress: Not on file   Relationships     Social connections:     Talks on phone: Not on file     Gets together: Not on file     Attends Baptism service: Not on file     Active member of club or organization: Not on file     Attends meetings of clubs or organizations: Not on file     Relationship status: Not on file     Intimate partner violence:     Fear of current or ex partner: Not on file     Emotionally abused: Not on file     Physically abused: Not on file     Forced sexual activity: Not on file   Other Topics Concern     Not on file   Social History Narrative    Lives .       Review of Systems    See HPI      Objective:         /78   Pulse 72   Ht 5' 7\" (1.702 m)   Wt 145 lb 12.8 oz (66.1 kg)   SpO2 96%   BMI 22.84 kg/m        Physical Exam:  General Appearance: Alert, cooperative, no distress, appears stated age  Head: " Normocephalic, without obvious abnormality, atraumatic  Eyes: PERRL, conjunctiva/corneas clear, EOM's intact  Ears: Normal TM's and external ear canals, both ears  Nose: Nares normal, septum midline,mucosa normal, no drainage  Throat: Lips, mucosa, and tongue normal; teeth and gums normal  Neck: Supple, symmetrical, trachea midline, no adenopathy;  thyroid: not enlarged, symmetric, no tenderness/mass/nodules;  Back: Symmetric, no curvature, ROM normal  Lungs: Clear to auscultation bilaterally, respirations unlabored  Breasts: No breast masses, tenderness, asymmetry, or nipple discharge.  Heart: Regular rate and rhythm, S1 and S2 normal, no murmur, rub, or gallop, Abdomen: Soft, non-tender, bowel sounds active all four quadrants,  no masses, no organomegaly  Pelvic:Not examined  Extremities: Extremities normal, atraumatic, no cyanosis or edema  Skin: Skin color, texture, turgor normal, no rashes or lesions  Lymph nodes: Cervical, supraclavicular, and axillary nodes normal  Neurologic: Normal

## 2021-06-26 NOTE — PROGRESS NOTES
Progress Notes by Ammy Vance CNP at 7/5/2018  7:20 AM     Author: Ammy Vance CNP Service: -- Author Type: Nurse Practitioner    Filed: 7/5/2018  8:37 AM Encounter Date: 7/5/2018 Status: Signed    : Ammy Vance CNP (Nurse Practitioner)       [facility] Consult Note    Date of Service:  7/5/2018    Requesting Provider: Alisha Hale MD    History of Present Illness:     Alisha Hale MD referred Gloria Myers for her leg ulcer.   The patient presents to clinic alone.  On June 1, she was bit by her neighbor's dog. Her rabbi test was negative.  She has finished taken several courses of Augmentin.  Initially, she applied neosporin on it, but this last week she is applying a band aide.  She continues to have some drainage from it.  She does not have pain in it except when she stands for an extended period she notices pressure in it.       Review of Symptoms:    GI: Appetite is good,       Cardiovascular:  denies Chest pain or discomfort;         Edema: There is no   edema noted in legs, except around the bite wound.    Respiratory:  denies unusual shortness of breath    Bowels: No concerns     : No concerns.     MSK: Patient is ambulatory    Neurological:   denies weakness, numbness, tingling in leg    Endocrine: is not diabetic     All other systems were reviewed are not pertinent to the presenting problem.    Past Medical History:    Past Medical History:   Diagnosis Date   ? Fibroids    ? Hypothyroidism    ? Vaginal atrophy         Surgical History:   Past Surgical History:   Procedure Laterality Date   ? HI CYSTOURETHROSCOPY,FULGUR <0.5 CM ADRIANA      Description: Cystoscopy With Fulguration Minor Lesion (Under 5mm);  Recorded: 12/31/2012;       Allergies: No Known Allergies       Medications:    Current Outpatient Prescriptions:   ?  acetaminophen (TYLENOL) 325 MG tablet, Take 650 mg by mouth every 6 (six) hours as needed for pain., Disp: , Rfl:   ?  aspirin 81  MG EC tablet, Take 1 tablet (81 mg total) by mouth daily., Disp: , Rfl:   ?  B INFANTIS/B ANI/B EUSEBIO/B BIFID (PROBIOTIC 4X ORAL), Take by mouth., Disp: , Rfl:   ?  calcium-vitamin D (CALCIUM-VITAMIN D) 500 mg(1,250mg) -200 unit per tablet, Take 1 tablet by mouth 2 (two) times a day with meals., Disp: , Rfl:   ?  estradiol (ESTRACE) 0.01 % (0.1 mg/gram) vaginal cream, Use 0.5 gm three times a week. (Patient taking differently: Use 0.5 gm weekly), Disp: 42.5 g, Rfl: 5  ?  hydrOXYzine (ATARAX) 25 MG tablet, Take 25 mg by mouth. As needed for itching, Disp: , Rfl:   ?  levothyroxine (SYNTHROID) 112 MCG tablet, Take 1 tablet (112 mcg total) by mouth daily., Disp: 90 tablet, Rfl: 0  ?  gentamicin (GARAMYCIN) 0.1 % ointment, Apply as instructed to ulcer daily, Disp: 30 g, Rfl: 1  ?  mupirocin (BACTROBAN) 2 % ointment, Apply as instructed to ulcer daily, Disp: 30 g, Rfl: 1    Current Facility-Administered Medications:   ?  lidocaine 2 % jelly (XYLOCAINE), , Topical, PRN, Ammy Vance, ANA    Family history:   Family History   Problem Relation Age of Onset   ? Hypothyroidism Mother    ? Narcolepsy Mother    ? Heart disease Father    ? Asthma Sister    ? Hypothyroidism Sister    ? Breast cancer Maternal Grandmother    ? Breast cancer Cousin    ? Breast cancer Cousin    ? Breast cancer Cousin    ? Asthma Brother    ? No Medical Problems Son    ? No Medical Problems Son          Social History:   Social History     Social History   ? Marital status:      Spouse name: N/A   ? Number of children: N/A   ? Years of education: N/A     Occupational History   ? Not on file.     Social History Main Topics   ? Smoking status: Former Smoker     Packs/day: 0.25     Years: 3.00     Quit date: 1/19/1980   ? Smokeless tobacco: Never Used   ? Alcohol use 0.6 oz/week     1 Glasses of wine per week      Comment: On ocassions   ? Drug use: No   ? Sexual activity: Not Currently     Partners: Male     Birth control/ protection:  Post-menopausal     Other Topics Concern   ? Not on file     Social History Narrative    Lives .        Physical Exam    General: This is a 57 y.o. female who appears their reported age, not in acute distress    Constitution:  Vital Signs: /70  Pulse 72  Temp 98  F (36.7  C) (Oral)   Resp 16 There is no height or weight on file to calculate BMI.    Psychiatric: Alert and oriented X 3, in no apparent distress. Calm and cooperative throughout exam    Head/Neck:  Normocephalic, atraumatic    Cardiovascular:  Rate and Rhythm is regular    Respiratory:  Lungs are clear to auscultation in all fields bilaterally.     Abdomen:  Normal bowel sounds. Soft, symmetric, no guarding or rigidity, non tender with palpation.  No organomegaly or masses palpated.     Integumentary/Hair/Nails:  Turgor and texture are normal.  Nails are normal.    MSK:  BilateralNo ROM deficit in foot/feet    Groin Lymphatics: No inguinal lymphadenopathy:    Neurological:  Grossly intact. Lower extremity sensation:  Vascular:    Arterial:    bilateral  Using a handheld doppler, the bilateral pulse is dorsalis pedis and posterior tibial pulses, strong and unrestrictive and biphasic in nature    bilateral toes there is less than a 3 second capillary refill. Hair growth on feet is absent bilaterally          Venous:  There is no venous distention on theBilateral legs.  There is telangiectasias on the Bilateral lower extremity    There is no  edema noted in bilateral LE.  Stemmers sign negative.      Circumferential volume measures:    Vasc Edema 7/5/2018   Right just above MTP 18   Right Ankle 20.7   Right Widest Calf 35.7   Right Thigh Up 10cm 42   Left - just above MTP 19.8   Left Ankle 21   Left Widest Calf 35.6   Left Thigh Up 10cm 41.5       Ulceration(s)/Wound(s):     Left Leg Ulceration(s):     Wound bed: %100 loose slough          Undermining no, Tunneling no   Wound Edge: attached   Periwound:  There is slight juan ramon wound erythema,  but no maceration, denudement fluctuance or warmth surrounding the ulcer(s).  There is moderate induration surrounding the ulcer.  Exudate: minimal serosanguineous    Odor:  absent   Wound Shape regular    Wound 07/05/18 left posterior leg (Active)   Pre Size Length 0.8 7/5/2018  7:00 AM   Pre Size Width 0.3 7/5/2018  7:00 AM   Pre Size Depth 0 7/5/2018  7:00 AM   Pre Total Sq cm 0.24 7/5/2018  7:00 AM   Post Size Length 0.8 7/5/2018  7:00 AM   Post Size Width 0.3 7/5/2018  7:00 AM   Post Size Depth 0.1 7/5/2018  7:00 AM       Photo       Laboratory studies:     Lab Results   Component Value Date    WBC 5.4 06/11/2018    HGB 13.3 06/11/2018    HCT 39.8 06/11/2018    MCV 95 06/11/2018     06/11/2018     No results found for: CREATININE  No results found for: BUN  No results found for: CRP  No results found for: SEDRATE  No results found for: LABPROT, ALBUMIN  No results found for: HGBA1C    Lab Results   Component Value Date    TSH 1.41 06/11/2018         No results found for: BNP  No results found for this or any previous visit.  Vitamin D, Total (25-Hydroxy)   Date Value Ref Range Status   02/22/2018 35.2 30.0 - 80.0 ng/mL Final       Imaging:  None pertinent     Assessment:  1. Bite wound  mupirocin (BACTROBAN) 2 % ointment    gentamicin (GARAMYCIN) 0.1 % ointment   2. Local infection of wound  mupirocin (BACTROBAN) 2 % ointment    gentamicin (GARAMYCIN) 0.1 % ointment       Assessment/Plan:  1.  Debridement of the left leg ulcer was recommended.  After consent was obtained and topical 2% Xylocaine was applied under clean conditions, and using a #15 blade,the devitalized dermal tissue was debrided for a total square centimeters of 0.24. Following debridement, there was a decrease in the nonviable tissue. The patient tolerated the procedure without any difficulty.      2.  Leg ulcer secondary to a dog bite.  There are signs of local infection, but I will treat topically for now.  If the ulcer fails to  improve, I will consider culturing the ulcer.    3.  Treatment.   See above.  Will apply equal parts of Bactroban and Gentamycin    4.   Patient to return to clinic in four week(s) for reevaluation. They were instructed to call the clinic sooner with any further questions or concerns. Answered all questions.      Ammy Vance, APRN, CNP, [facility]  [phone]

## 2021-07-01 ENCOUNTER — RECORDS - HEALTHEAST (OUTPATIENT)
Dept: ADMINISTRATIVE | Facility: OTHER | Age: 61
End: 2021-07-01

## 2021-07-03 NOTE — ADDENDUM NOTE
Addendum Note by Alisha White MD at 4/3/2020  4:47 PM     Author: Alisha White MD Service: -- Author Type: Physician    Filed: 4/3/2020  4:47 PM Encounter Date: 4/3/2020 Status: Signed    : Alisha White MD (Physician)    Addended by: ALISHA WHITE on: 4/3/2020 04:47 PM        Modules accepted: Orders

## 2021-07-13 ENCOUNTER — RECORDS - HEALTHEAST (OUTPATIENT)
Dept: ADMINISTRATIVE | Facility: CLINIC | Age: 61
End: 2021-07-13

## 2021-07-21 ENCOUNTER — RECORDS - HEALTHEAST (OUTPATIENT)
Dept: ADMINISTRATIVE | Facility: CLINIC | Age: 61
End: 2021-07-21

## 2021-08-04 ENCOUNTER — OFFICE VISIT (OUTPATIENT)
Dept: FAMILY MEDICINE | Facility: CLINIC | Age: 61
End: 2021-08-04
Payer: COMMERCIAL

## 2021-08-04 VITALS
DIASTOLIC BLOOD PRESSURE: 80 MMHG | WEIGHT: 128.56 LBS | SYSTOLIC BLOOD PRESSURE: 120 MMHG | HEIGHT: 66 IN | BODY MASS INDEX: 20.66 KG/M2 | HEART RATE: 83 BPM

## 2021-08-04 DIAGNOSIS — R10.31 RLQ ABDOMINAL PAIN: ICD-10-CM

## 2021-08-04 DIAGNOSIS — Z00.00 ROUTINE HISTORY AND PHYSICAL EXAMINATION OF ADULT: Primary | ICD-10-CM

## 2021-08-04 DIAGNOSIS — F41.9 ANXIETY: ICD-10-CM

## 2021-08-04 DIAGNOSIS — E03.9 ACQUIRED HYPOTHYROIDISM: ICD-10-CM

## 2021-08-04 LAB — HGB BLD-MCNC: 12.9 G/DL (ref 11.7–15.7)

## 2021-08-04 PROCEDURE — 99213 OFFICE O/P EST LOW 20 MIN: CPT | Mod: 25 | Performed by: FAMILY MEDICINE

## 2021-08-04 PROCEDURE — 82947 ASSAY GLUCOSE BLOOD QUANT: CPT | Performed by: FAMILY MEDICINE

## 2021-08-04 PROCEDURE — 99396 PREV VISIT EST AGE 40-64: CPT | Performed by: FAMILY MEDICINE

## 2021-08-04 PROCEDURE — 36415 COLL VENOUS BLD VENIPUNCTURE: CPT | Performed by: FAMILY MEDICINE

## 2021-08-04 PROCEDURE — 84443 ASSAY THYROID STIM HORMONE: CPT | Performed by: FAMILY MEDICINE

## 2021-08-04 PROCEDURE — 85018 HEMOGLOBIN: CPT | Performed by: FAMILY MEDICINE

## 2021-08-04 PROCEDURE — 80061 LIPID PANEL: CPT | Performed by: FAMILY MEDICINE

## 2021-08-04 RX ORDER — HYDROXYZINE HYDROCHLORIDE 10 MG/1
10 TABLET, FILM COATED ORAL EVERY 6 HOURS PRN
Qty: 30 TABLET | Refills: 3 | Status: SHIPPED | OUTPATIENT
Start: 2021-08-04 | End: 2022-08-10

## 2021-08-04 ASSESSMENT — MIFFLIN-ST. JEOR: SCORE: 1159.91

## 2021-08-05 LAB
CHOLEST SERPL-MCNC: 194 MG/DL
FASTING STATUS PATIENT QL REPORTED: NORMAL
FASTING STATUS PATIENT QL REPORTED: NORMAL
GLUCOSE BLD-MCNC: 86 MG/DL (ref 70–125)
HDLC SERPL-MCNC: 85 MG/DL
LDLC SERPL CALC-MCNC: 93 MG/DL
TRIGL SERPL-MCNC: 78 MG/DL
TSH SERPL DL<=0.005 MIU/L-ACNC: 0.05 UIU/ML (ref 0.3–5)

## 2021-08-05 ASSESSMENT — PATIENT HEALTH QUESTIONNAIRE - PHQ9: SUM OF ALL RESPONSES TO PHQ QUESTIONS 1-9: 3

## 2021-08-05 NOTE — PROGRESS NOTES
SUBJECTIVE:   CC: Gloria Myers is an 60 year old woman who presents for preventive health visit.       Patient has been advised of split billing requirements and indicates understanding: Yes  HPI     We reviewed her health history.  She was last seen for a physical in 2020.  No significant changes in her health.  Remains in good overall health.  She does have hypothyroidism and takes levothyroxine.  She has history of some anxiety and occasional panic attacks.  Uses hydroxyzine on an as-needed basis. States that her anxiety levels have been higher recently.  States that work has been stressful.  She is concerned that her thyroid level may be off.  She is using hydroxyzine about 4 days a week.  She is not sure if she should take a daily medication to manage her anxiety.  States that hydroxyzine does make her drowsy. She has history of uterine fibroids that have previously been evaluated by ultrasound.  She has been noticing some occasional discomfort in the right lower abdomen as well as some pain in her lower back.  Has also noticed more gassiness.  Wonders if this could be related to the thyroid. Has history of urethral caruncle and has previously been evaluated by [facility] urology.  It is benign and she uses topical estrogen cream once a week and does not have any problems.  She is  and she has twin sons.  She works at [employer] and has been there for the past 39 years.   She was having some left chest wall pain last year.  She reports that has resolved she sees her dermatologist.   yearly for skin exam due to history of skin cancer.  She is up-to-date on vision and dental exams.  She exercises regularly and tries to follow healthy diet.  Tries to get adequate calcium and vitamin D intake.  Vaccines are up-to-date. Review of systems otherwise negative.  No other concerns or questions.     Today's PHQ-2 Score:   PHQ-2 ( 1999 Pfizer) 8/4/2021   Q1: Little interest or pleasure in doing things 0   Q2:  Feeling down, depressed or hopeless 0   PHQ-2 Score 0   Q1: Little interest or pleasure in doing things -   Q2: Feeling down, depressed or hopeless -   PHQ-2 Score -         Social History     Tobacco Use     Smoking status: Former Smoker     Packs/day: 0.25     Years: 3.00     Pack years: 0.75     Types: Cigarettes     Quit date: 1980     Years since quittin.5     Smokeless tobacco: Never Used   Substance Use Topics     Alcohol use: Yes     Alcohol/week: 1.0 standard drinks     Comment: Alcoholic Drinks/day: On ocassions         Alcohol Use 2021   Prescreen: >3 drinks/day or >7 drinks/week? No       Reviewed orders with patient.  Reviewed health maintenance and updated orders accordingly - Yes  Current Outpatient Medications   Medication Sig Dispense Refill     B INFANTIS/B ANI/B EUSEBIO/B BIFID (PROBIOTIC 4X ORAL) [B INFANTIS/B ANI/B EUSEBIO/B BIFID (PROBIOTIC 4X ORAL)] Take by mouth.       calcium-vitamin D (CALCIUM-VITAMIN D) 500 mg(1,250mg) -200 unit per tablet [CALCIUM-VITAMIN D (CALCIUM-VITAMIN D) 500 MG(1,250MG) -200 UNIT PER TABLET] Take 1 tablet by mouth 2 (two) times a day with meals.       estradiol (ESTRACE) 0.01 % (0.1 mg/gram) vaginal cream [ESTRADIOL (ESTRACE) 0.01 % (0.1 MG/GRAM) VAGINAL CREAM] Use 0.5 gm weekly 42.5 g 5     hydrOXYzine (ATARAX) 10 MG tablet Take 1 tablet (10 mg) by mouth every 6 hours as needed for anxiety 30 tablet 3     levothyroxine (SYNTHROID) 112 MCG tablet [LEVOTHYROXINE (SYNTHROID) 112 MCG TABLET] Take 1 tablet (112 mcg total) by mouth daily. 90 tablet 3       Breast Cancer Screening:  Any new diagnosis of family breast, ovarian, or bowel cancer? No    FHS-7: No flowsheet data found.      Pertinent mammograms are reviewed under the imaging tab.    History of abnormal Pap smear: NO - age 30-65 PAP every 5 years with negative HPV co-testing recommended  PAP / HPV Latest Ref Rng & Units 7/10/2020 2015   PAP Negative for squamous intraepithelial lesion or malignancy.  "Negative for squamous intraepithelial lesion or malignancy  Electronically signed by Ruthann Grande CT (ASCP) on 7/16/2020 at  2:30 PM   Negative for squamous intraepithelial lesion or malignancy  Electronically signed by Ruthann Grande CT (ASCP) on 1/28/2015 at  3:38 PM     HPV16 NEG Negative -   HPV18 NEG Negative -   HRHPV NEG Negative -     Reviewed and updated as needed this visit by clinical staff  Tobacco  Allergies  Meds              Reviewed and updated as needed this visit by Provider   Allergies  Meds                 Review of Systems  CONSTITUTIONAL: NEGATIVE for fever, chills, change in weight  INTEGUMENTARY/SKIN: NEGATIVE for worrisome rashes, moles or lesions  EYES: NEGATIVE for vision changes or irritation  ENT: NEGATIVE for ear, mouth and throat problems  RESP: NEGATIVE for significant cough or SOB  BREAST: NEGATIVE for masses, tenderness or discharge  CV: NEGATIVE for chest pain, palpitations or peripheral edema  GI: see HPI  : NEGATIVE for unusual urinary or vaginal symptoms. No vaginal bleeding.  MUSCULOSKELETAL: NEGATIVE for significant arthralgias or myalgia  NEURO: NEGATIVE for weakness, dizziness or paresthesias  PSYCHIATRIC: NEGATIVE for changes in mood or affect      OBJECTIVE:   /80   Pulse 83   Ht 1.676 m (5' 6\")   Wt 58.3 kg (128 lb 9 oz)   BMI 20.75 kg/m    Physical Exam  GENERAL APPEARANCE: healthy, alert and no distress  EYES: Eyes grossly normal to inspection, PERRL and conjunctivae and sclerae normal  HENT: ear canals and TM's normal  NECK: no adenopathy, no asymmetry, masses, or scars and thyroid normal to palpation  RESP: lungs clear to auscultation - no rales, rhonchi or wheezes  BREAST: normal without masses, tenderness or nipple discharge and no palpable axillary masses or adenopathy  CV: regular rate and rhythm, normal S1 S2, no S3 or S4, no murmur, click or rub, no peripheral edema and peripheral pulses strong  ABDOMEN: soft, nontender, no " hepatosplenomegaly, no masses and bowel sounds normal  MS: no musculoskeletal defects are noted and gait is age appropriate without ataxia  SKIN: no suspicious lesions or rashes  NEURO: Normal strength and tone, sensory exam grossly normal, mentation intact and speech normal  PSYCH: mentation appears normal and affect normal/bright    CARMEN-7: 3    ASSESSMENT/PLAN:   Gloria was seen today for physical.    Diagnoses and all orders for this visit:    Routine history and physical examination of adult  -     Hemoglobin; Future  -     Glucose; Future  -     Lipid panel reflex to direct LDL Non-fasting; Future  -     Hemoglobin  -     Glucose  -     Lipid panel reflex to direct LDL Non-fasting  -Pap smear is up-to-date.  Next Pap due in 2025.  Mammogram was done in May.  Next colonoscopy will be due in 2023 due to history of polyps.  DEXA scan will be due in 2023.  Vaccines up-to-date.  We will check lipids, glucose and hemoglobin.  Encouraged regular exercise and healthy diet.  Follow-up in 1 year for annual physical    Anxiety  -     hydrOXYzine (ATARAX) 10 MG tablet; Take 1 tablet (10 mg) by mouth every 6 hours as needed for anxiety  -We discussed options for managing her anxiety.  Discussed it is okay to continue hydroxyzine on an as-needed basis.  We will try this at a lower dose to see if this causes less drowsiness.  We will check her TSH today to ensure that it is in the normal range.  Discussed possibly doing a daily SSRI medication but she would like to try the lower dose of hydroxyzine and check her thyroid level first.    RLQ abdominal pain  -     US Pelvic Complete with Transvaginal; Future  -She has history of uterine fibroids.  We will get updated pelvic ultrasound to see if these have changed in size    Acquired hypothyroidism  -     TSH; Future  -     TSH  -Continue levothyroxine.        Patient has been advised of split billing requirements and indicates understanding: Yes  COUNSELING:  Reviewed preventive  "health counseling, as reflected in patient instructions       Regular exercise       Healthy diet/nutrition    Estimated body mass index is 20.75 kg/m  as calculated from the following:    Height as of this encounter: 1.676 m (5' 6\").    Weight as of this encounter: 58.3 kg (128 lb 9 oz).        She reports that she quit smoking about 41 years ago. Her smoking use included cigarettes. She has a 0.75 pack-year smoking history. She has never used smokeless tobacco.      Counseling Resources:  ATP IV Guidelines  Pooled Cohorts Equation Calculator  Breast Cancer Risk Calculator  BRCA-Related Cancer Risk Assessment: FHS-7 Tool  FRAX Risk Assessment  ICSI Preventive Guidelines  Dietary Guidelines for Americans, 2010  USDA's MyPlate  ASA Prophylaxis  Lung CA Screening    Alisha Hale MD  [facility]  "

## 2021-08-06 ENCOUNTER — HOSPITAL ENCOUNTER (OUTPATIENT)
Dept: ULTRASOUND IMAGING | Facility: HOSPITAL | Age: 61
Discharge: HOME | End: 2021-08-06
Attending: FAMILY MEDICINE | Admitting: FAMILY MEDICINE
Payer: COMMERCIAL

## 2021-08-06 DIAGNOSIS — R10.31 RLQ ABDOMINAL PAIN: ICD-10-CM

## 2021-08-06 DIAGNOSIS — E03.9 ACQUIRED HYPOTHYROIDISM: Primary | ICD-10-CM

## 2021-08-06 PROCEDURE — 76830 TRANSVAGINAL US NON-OB: CPT

## 2021-08-06 RX ORDER — LEVOTHYROXINE SODIUM 100 UG/1
100 TABLET ORAL DAILY
Qty: 90 TABLET | Refills: 3 | Status: SHIPPED | OUTPATIENT
Start: 2021-08-06 | End: 2022-03-02

## 2021-10-11 ENCOUNTER — HEALTH MAINTENANCE LETTER (OUTPATIENT)
Age: 61
End: 2021-10-11

## 2021-10-18 ENCOUNTER — TRANSFERRED RECORDS (OUTPATIENT)
Dept: HEALTH INFORMATION MANAGEMENT | Facility: CLINIC | Age: 61
End: 2021-10-18

## 2021-11-11 ENCOUNTER — TRANSFERRED RECORDS (OUTPATIENT)
Dept: HEALTH INFORMATION MANAGEMENT | Facility: CLINIC | Age: 61
End: 2021-11-11
Payer: COMMERCIAL

## 2022-02-28 ENCOUNTER — LAB (OUTPATIENT)
Dept: LAB | Facility: CLINIC | Age: 62
End: 2022-02-28
Payer: COMMERCIAL

## 2022-02-28 DIAGNOSIS — E03.9 ACQUIRED HYPOTHYROIDISM: ICD-10-CM

## 2022-02-28 LAB — TSH SERPL DL<=0.005 MIU/L-ACNC: 11.23 UIU/ML (ref 0.3–5)

## 2022-02-28 PROCEDURE — 84443 ASSAY THYROID STIM HORMONE: CPT

## 2022-02-28 PROCEDURE — 36415 COLL VENOUS BLD VENIPUNCTURE: CPT

## 2022-03-02 DIAGNOSIS — E03.9 ACQUIRED HYPOTHYROIDISM: Primary | ICD-10-CM

## 2022-03-02 RX ORDER — LEVOTHYROXINE SODIUM 100 UG/1
TABLET ORAL
Qty: 2 TABLET | Refills: 3
Start: 2022-03-02 | End: 2022-04-14

## 2022-03-02 RX ORDER — LEVOTHYROXINE SODIUM 112 UG/1
TABLET ORAL
Qty: 36 TABLET | Refills: 3 | Status: SHIPPED | OUTPATIENT
Start: 2022-03-02 | End: 2022-04-14

## 2022-04-13 ENCOUNTER — LAB (OUTPATIENT)
Dept: LAB | Facility: CLINIC | Age: 62
End: 2022-04-13
Payer: COMMERCIAL

## 2022-04-13 DIAGNOSIS — E03.9 ACQUIRED HYPOTHYROIDISM: ICD-10-CM

## 2022-04-13 LAB — TSH SERPL DL<=0.005 MIU/L-ACNC: 0.09 UIU/ML (ref 0.3–5)

## 2022-04-13 PROCEDURE — 84443 ASSAY THYROID STIM HORMONE: CPT

## 2022-04-13 PROCEDURE — 36415 COLL VENOUS BLD VENIPUNCTURE: CPT

## 2022-04-14 DIAGNOSIS — E03.9 ACQUIRED HYPOTHYROIDISM: ICD-10-CM

## 2022-04-14 RX ORDER — LEVOTHYROXINE SODIUM 112 UG/1
TABLET ORAL
Qty: 24 TABLET | Refills: 3
Start: 2022-04-14 | End: 2022-06-16

## 2022-04-14 RX ORDER — LEVOTHYROXINE SODIUM 100 UG/1
TABLET ORAL
Qty: 36 TABLET | Refills: 3
Start: 2022-04-14 | End: 2022-06-16

## 2022-05-10 ENCOUNTER — TRANSFERRED RECORDS (OUTPATIENT)
Dept: HEALTH INFORMATION MANAGEMENT | Facility: CLINIC | Age: 62
End: 2022-05-10
Payer: COMMERCIAL

## 2022-05-12 ENCOUNTER — MEDICAL CORRESPONDENCE (OUTPATIENT)
Dept: HEALTH INFORMATION MANAGEMENT | Facility: CLINIC | Age: 62
End: 2022-05-12
Payer: COMMERCIAL

## 2022-05-24 ENCOUNTER — ANCILLARY PROCEDURE (OUTPATIENT)
Dept: MAMMOGRAPHY | Facility: CLINIC | Age: 62
End: 2022-05-24
Attending: FAMILY MEDICINE
Payer: COMMERCIAL

## 2022-05-24 DIAGNOSIS — Z12.31 VISIT FOR SCREENING MAMMOGRAM: ICD-10-CM

## 2022-05-24 PROCEDURE — 77067 SCR MAMMO BI INCL CAD: CPT

## 2022-06-13 ENCOUNTER — LAB (OUTPATIENT)
Dept: LAB | Facility: CLINIC | Age: 62
End: 2022-06-13
Payer: COMMERCIAL

## 2022-06-13 DIAGNOSIS — E03.9 ACQUIRED HYPOTHYROIDISM: ICD-10-CM

## 2022-06-13 LAB
T3 SERPL-MCNC: 95 NG/DL (ref 60–181)
T4 FREE SERPL-MCNC: 1.21 NG/DL (ref 0.7–1.8)
TSH SERPL DL<=0.005 MIU/L-ACNC: 0.06 UIU/ML (ref 0.3–5)

## 2022-06-13 PROCEDURE — 84480 ASSAY TRIIODOTHYRONINE (T3): CPT

## 2022-06-13 PROCEDURE — 36415 COLL VENOUS BLD VENIPUNCTURE: CPT

## 2022-06-13 PROCEDURE — 84439 ASSAY OF FREE THYROXINE: CPT

## 2022-06-13 PROCEDURE — 84443 ASSAY THYROID STIM HORMONE: CPT

## 2022-06-16 DIAGNOSIS — E03.9 ACQUIRED HYPOTHYROIDISM: Primary | ICD-10-CM

## 2022-06-16 RX ORDER — LEVOTHYROXINE SODIUM 88 UG/1
88 TABLET ORAL DAILY
Qty: 90 TABLET | Refills: 3 | Status: SHIPPED | OUTPATIENT
Start: 2022-06-16 | End: 2022-10-03

## 2022-06-21 ENCOUNTER — HOSPITAL ENCOUNTER (OUTPATIENT)
Dept: ULTRASOUND IMAGING | Facility: HOSPITAL | Age: 62
Discharge: HOME | End: 2022-06-21
Attending: FAMILY MEDICINE | Admitting: FAMILY MEDICINE
Payer: COMMERCIAL

## 2022-06-21 DIAGNOSIS — E03.9 ACQUIRED HYPOTHYROIDISM: ICD-10-CM

## 2022-06-21 PROCEDURE — 76536 US EXAM OF HEAD AND NECK: CPT

## 2022-08-06 ENCOUNTER — MYC REFILL (OUTPATIENT)
Dept: FAMILY MEDICINE | Facility: CLINIC | Age: 62
End: 2022-08-06

## 2022-08-06 DIAGNOSIS — N95.2 VAGINAL ATROPHY: ICD-10-CM

## 2022-08-07 RX ORDER — ESTRADIOL 0.1 MG/G
CREAM VAGINAL
Qty: 42.5 G | Refills: 5 | Status: SHIPPED | OUTPATIENT
Start: 2022-08-07 | End: 2024-01-05

## 2022-08-07 NOTE — TELEPHONE ENCOUNTER
"Routing refill request to provider for review/approval because:  Patient needs to be seen because it has been more than 1 year since last office visit.    Last Written Prescription Date:  8/7/18  Last Fill Quantity: 42.5,  # refills: 5   Last office visit provider:  8/4/21     Requested Prescriptions   Pending Prescriptions Disp Refills     estradiol (ESTRACE) 0.1 MG/GM vaginal cream 42.5 g 5       Hormone Replacement Therapy Failed - 8/6/2022 11:11 AM        Failed - Blood pressure under 140/90 in past 12 months     BP Readings from Last 3 Encounters:   08/04/21 120/80   01/29/21 130/80   07/10/20 121/80                 Failed - Recent (12 mo) or future (30 days) visit within the authorizing provider's specialty     Patient has had an office visit with the authorizing provider or a provider within the authorizing providers department within the previous 12 mos or has a future within next 30 days. See \"Patient Info\" tab in inbasket, or \"Choose Columns\" in Meds & Orders section of the refill encounter.              Passed - Patient has mammogram in past 2 years on file if age 50-75        Passed - Medication is active on med list        Passed - Patient is 18 years of age or older        Passed - No active pregnancy on record        Passed - No positive pregnancy test on record in past 12 months             Gloria Magallanes RN 08/06/22 10:37 PM  "

## 2022-08-08 ENCOUNTER — LAB (OUTPATIENT)
Dept: LAB | Facility: CLINIC | Age: 62
End: 2022-08-08
Payer: COMMERCIAL

## 2022-08-08 DIAGNOSIS — E03.9 ACQUIRED HYPOTHYROIDISM: ICD-10-CM

## 2022-08-08 LAB — TSH SERPL DL<=0.005 MIU/L-ACNC: 1.08 UIU/ML (ref 0.3–4.2)

## 2022-08-08 PROCEDURE — 36415 COLL VENOUS BLD VENIPUNCTURE: CPT

## 2022-08-08 PROCEDURE — 84443 ASSAY THYROID STIM HORMONE: CPT

## 2022-08-10 DIAGNOSIS — F41.9 ANXIETY: ICD-10-CM

## 2022-08-10 RX ORDER — HYDROXYZINE HYDROCHLORIDE 10 MG/1
10 TABLET, FILM COATED ORAL EVERY 6 HOURS PRN
Qty: 30 TABLET | Refills: 3 | Status: SHIPPED | OUTPATIENT
Start: 2022-08-10 | End: 2022-08-24

## 2022-08-11 NOTE — TELEPHONE ENCOUNTER
"Routing refill request to provider for review/approval because:  Patient needs to be seen because it has been more than 1 year since last office visit.    Last Written Prescription Date:  8/4/21  Last Fill Quantity: 30,  # refills: 3   Last office visit provider:  8/4/21     Requested Prescriptions   Pending Prescriptions Disp Refills     hydrOXYzine (ATARAX) 10 MG tablet [Pharmacy Med Name: HYDROXYZINE HCL 10 MG TABLET] 30 tablet 3     Sig: TAKE 1 TABLET (10 MG) BY MOUTH EVERY 6 HOURS AS NEEDED FOR ANXIETY       Antihistamines Protocol Failed - 8/10/2022  8:42 AM        Failed - Recent (12 mo) or future (30 days) visit within the authorizing provider's specialty     Patient has had an office visit with the authorizing provider or a provider within the authorizing providers department within the previous 12 mos or has a future within next 30 days. See \"Patient Info\" tab in inbasket, or \"Choose Columns\" in Meds & Orders section of the refill encounter.              Passed - Patient is age 3 or older     Apply age and/or weight-based dosing for peds patients age 3 and older.    Forward request to provider for patients under the age of 3.          Passed - Medication is active on med list             Gloria Magallanes RN 08/10/22 7:27 PM  "

## 2022-08-24 ENCOUNTER — OFFICE VISIT (OUTPATIENT)
Dept: FAMILY MEDICINE | Facility: CLINIC | Age: 62
End: 2022-08-24
Payer: COMMERCIAL

## 2022-08-24 VITALS
HEART RATE: 86 BPM | SYSTOLIC BLOOD PRESSURE: 155 MMHG | BODY MASS INDEX: 21.63 KG/M2 | DIASTOLIC BLOOD PRESSURE: 88 MMHG | WEIGHT: 134 LBS | RESPIRATION RATE: 16 BRPM | TEMPERATURE: 98.9 F | OXYGEN SATURATION: 98 %

## 2022-08-24 DIAGNOSIS — H81.13 BENIGN PAROXYSMAL POSITIONAL VERTIGO DUE TO BILATERAL VESTIBULAR DISORDER: Primary | ICD-10-CM

## 2022-08-24 PROCEDURE — 99215 OFFICE O/P EST HI 40 MIN: CPT | Performed by: PREVENTIVE MEDICINE

## 2022-08-24 RX ORDER — HYDROXYZINE HYDROCHLORIDE 25 MG/1
25 TABLET, FILM COATED ORAL
COMMUNITY
Start: 2021-02-08 | End: 2022-11-24

## 2022-08-27 NOTE — PROGRESS NOTES
Assessment & Plan   1. Benign paroxysmal positional vertigo due to bilateral vestibular disorder  - Physical Therapy Referral; Future  Modified epley maneuvers until free of symptoms for 24 hours  PT if not improving in the next few days     44 minutes spent on the date of the encounter doing chart review, review of outside records, patient visit, and documentation         No follow-ups on file.    Jay Hatch MD  [facility]    Subjective     Gloria Myers is a 61 year old year old female who presents to clinic today for the following health issues:    Patient presents with:  Dizziness: X3 days, gets dizzy when getting out of bed      This is a 60 yo female who has felt like the room has been spinning since getting out of bed three days ago.  She has not hit her head.  No cp, sob, palpitations, loc, weakness in arms or legs, headache, change in vision or speech.  Improves if she holds her head still.  Had similar symptoms several years ago.  No change in hearing or ringing in her ears.    Patient Active Problem List   Diagnosis    Hypothyroidism    Leiomyoma Of The Uterus    History of squamous cell carcinoma of skin       Current Outpatient Medications   Medication    calcium carbonate-vitamin D (OS-ZACHARY WITH D) 500-200 MG-UNIT tablet    calcium-vitamin D (CALCIUM-VITAMIN D) 500 mg(1,250mg) -200 unit per tablet    estradiol (ESTRACE) 0.1 MG/GM vaginal cream    hydrOXYzine (ATARAX) 25 MG tablet    levothyroxine (SYNTHROID/LEVOTHROID) 88 MCG tablet     No current facility-administered medications for this visit.       Past Medical History:   Diagnosis Date    Anxiety     Cancer (H) 2019    Skin cancer    Fibroids     Hypothyroidism     Vaginal atrophy        Social History   reports that she quit smoking about 42 years ago. Her smoking use included cigarettes. She has a 0.75 pack-year smoking history. She has never used smokeless tobacco. She reports current alcohol use of about  1.0 standard drink of alcohol per week. She reports that she does not use drugs.    Family History   Problem Relation Age of Onset    Hypothyroidism Mother     Narcolepsy Mother     Asthma Mother     Heart Disease Father     Asthma Sister     Hypothyroidism Sister     Breast Cancer Maternal Grandmother         Unsure of age    Asthma Brother     No Known Problems Son     No Known Problems Son        Review of Systems  Constitutional, HEENT, cardiovascular, pulmonary, GI, , musculoskeletal, neuro, skin, endocrine and psych systems are negative, except as otherwise noted.      Objective    BP (!) 155/88 (BP Location: Right arm, Patient Position: Sitting, Cuff Size: Adult Regular)   Pulse 86   Temp 98.9  F (37.2  C) (Oral)   Resp 16   Wt 60.8 kg (134 lb)   SpO2 98%   BMI 21.63 kg/m    Physical Exam   GENERAL: healthy, alert and no distress  EYES: Eyes grossly normal to inspection, PERRL and conjunctivae and sclerae normal  HENT: ear canals and TM's normal, nose and mouth without ulcers or lesions  NECK: no adenopathy, no asymmetry, masses, or scars and thyroid normal to palpation  RESP: lungs clear to auscultation - no rales, rhonchi or wheezes  CV: regular rate and rhythm, normal S1 S2, no S3 or S4, no murmur, click or rub, no peripheral edema and peripheral pulses strong  ABDOMEN: soft, nontender, no hepatosplenomegaly, no masses and bowel sounds normal  MS: no gross musculoskeletal defects noted, no edema  SKIN: no suspicious lesions or rashes  NEURO: Normal strength and tone, mentation intact and speech normal  PSYCH: mentation appears normal, affect normal/bright  NEURO: nl strength, sensation, reflexes, cranial nerves, and cerebellar exam  +satinder hallpike  -HINTS

## 2022-09-25 ENCOUNTER — HEALTH MAINTENANCE LETTER (OUTPATIENT)
Age: 62
End: 2022-09-25

## 2022-09-26 ASSESSMENT — ENCOUNTER SYMPTOMS
CONSTIPATION: 0
BREAST MASS: 0
NAUSEA: 0
JOINT SWELLING: 0
NERVOUS/ANXIOUS: 1
HEADACHES: 0
PALPITATIONS: 0
ABDOMINAL PAIN: 0
PARESTHESIAS: 0
FEVER: 0
WEAKNESS: 0
EYE PAIN: 0
HEMATOCHEZIA: 0
SORE THROAT: 0
HEARTBURN: 0
MYALGIAS: 0
COUGH: 0
DYSURIA: 0
SHORTNESS OF BREATH: 0
HEMATURIA: 0
ARTHRALGIAS: 0
DIZZINESS: 0
FREQUENCY: 0
DIARRHEA: 0
CHILLS: 0

## 2022-10-03 ENCOUNTER — OFFICE VISIT (OUTPATIENT)
Dept: FAMILY MEDICINE | Facility: CLINIC | Age: 62
End: 2022-10-03

## 2022-10-03 VITALS
HEIGHT: 67 IN | SYSTOLIC BLOOD PRESSURE: 118 MMHG | WEIGHT: 138.8 LBS | HEART RATE: 76 BPM | OXYGEN SATURATION: 98 % | TEMPERATURE: 97.5 F | BODY MASS INDEX: 21.79 KG/M2 | DIASTOLIC BLOOD PRESSURE: 78 MMHG

## 2022-10-03 DIAGNOSIS — E03.9 ACQUIRED HYPOTHYROIDISM: ICD-10-CM

## 2022-10-03 DIAGNOSIS — H61.23 BILATERAL IMPACTED CERUMEN: ICD-10-CM

## 2022-10-03 DIAGNOSIS — F41.9 ANXIETY: ICD-10-CM

## 2022-10-03 DIAGNOSIS — Z00.00 ROUTINE HISTORY AND PHYSICAL EXAMINATION OF ADULT: Primary | ICD-10-CM

## 2022-10-03 LAB
CHOLEST SERPL-MCNC: 232 MG/DL
FASTING STATUS PATIENT QL REPORTED: YES
GLUCOSE SERPL-MCNC: 92 MG/DL (ref 70–99)
HDLC SERPL-MCNC: 100 MG/DL
HGB BLD-MCNC: 13.3 G/DL (ref 11.7–15.7)
LDLC SERPL CALC-MCNC: 119 MG/DL
NONHDLC SERPL-MCNC: 132 MG/DL
TRIGL SERPL-MCNC: 63 MG/DL
TSH SERPL DL<=0.005 MIU/L-ACNC: 9.15 UIU/ML (ref 0.3–4.2)

## 2022-10-03 PROCEDURE — 80061 LIPID PANEL: CPT | Performed by: FAMILY MEDICINE

## 2022-10-03 PROCEDURE — 84443 ASSAY THYROID STIM HORMONE: CPT | Performed by: FAMILY MEDICINE

## 2022-10-03 PROCEDURE — 85018 HEMOGLOBIN: CPT | Performed by: FAMILY MEDICINE

## 2022-10-03 PROCEDURE — 36415 COLL VENOUS BLD VENIPUNCTURE: CPT | Performed by: FAMILY MEDICINE

## 2022-10-03 PROCEDURE — 99396 PREV VISIT EST AGE 40-64: CPT | Performed by: FAMILY MEDICINE

## 2022-10-03 PROCEDURE — 82947 ASSAY GLUCOSE BLOOD QUANT: CPT | Performed by: FAMILY MEDICINE

## 2022-10-03 RX ORDER — LEVOTHYROXINE SODIUM 88 UG/1
88 TABLET ORAL DAILY
Qty: 90 TABLET | Refills: 3 | Status: SHIPPED | OUTPATIENT
Start: 2022-10-03 | End: 2022-10-07

## 2022-10-03 ASSESSMENT — ENCOUNTER SYMPTOMS
DIARRHEA: 0
CONSTIPATION: 0
NERVOUS/ANXIOUS: 1
FREQUENCY: 0
ABDOMINAL PAIN: 0
MYALGIAS: 0
COUGH: 0
HEMATOCHEZIA: 0
PALPITATIONS: 0
JOINT SWELLING: 0
HEMATURIA: 0
DYSURIA: 0
ARTHRALGIAS: 0
SORE THROAT: 0
WEAKNESS: 0
DIZZINESS: 0
SHORTNESS OF BREATH: 0
HEADACHES: 0
BREAST MASS: 0
CHILLS: 0
HEARTBURN: 0
EYE PAIN: 0
FEVER: 0
NAUSEA: 0
PARESTHESIAS: 0

## 2022-10-03 ASSESSMENT — PAIN SCALES - GENERAL: PAINLEVEL: NO PAIN (0)

## 2022-10-03 NOTE — PROGRESS NOTES
SUBJECTIVE:   CC: Dot is an 61 year old who presents for preventive health visit.    We reviewed her health history.  No significant changes in her health.  Remains in good overall health.  She does have hypothyroidism and takes levothyroxine.  Her thyroid level has been fluctuating a bit over the past year and her dose of levothyroxine has had to be adjusted on several occasions.  She has been referred to endocrinology and has upcoming appointment.  Currently she feels that her thyroid level is in the normal range.  Last TSH in early August was normal.  She was recently seen for vertigo.  She was referred for vestibular therapy and is now feeling much better.  She does have an upcoming appointment with an ENT specialist for some concerns with her right ear.  She has history of some anxiety and occasional panic attacks.  Uses hydroxyzine on an as-needed basis.   Uses this about once or twice a month.  She has history of uterine fibroids that have previously been evaluated by ultrasound. Has history of urethral caruncle and has previously been evaluated by [facility] urology.  It is benign and she uses topical estrogen cream once a week and does not have any problems.  She is  and she has twin sons.  She retired from her position at [employer] in January.  She is now spending time caring for her elderly mother who is 98 years old and lives independently.  Also watches her nieces young children 1 day a week.   She continues to see her dermatologist  yearly for skin exam due to history of skin cancer.  She is up-to-date on vision and dental exams.  She exercises regularly and tries to follow healthy diet.  Tries to get adequate calcium and vitamin D intake.  Vaccines are up-to-date. Review of systems otherwise negative.  No other concerns or questions.       Patient has been advised of split billing requirements and indicates understanding: Yes  Healthy Habits:     Getting at least 3 servings of Calcium per  day:  Yes    Bi-annual eye exam:  Yes    Dental care twice a year:  Yes    Sleep apnea or symptoms of sleep apnea:  None    Diet:  Regular (no restrictions)    Frequency of exercise:  4-5 days/week    Duration of exercise:  15-30 minutes    Taking medications regularly:  Yes    Medication side effects:  None    PHQ-2 Total Score: 0    Additional concerns today:  No              Today's PHQ-2 Score:   PHQ-2 (  [employer]) 2022   Q1: Little interest or pleasure in doing things 0   Q2: Feeling down, depressed or hopeless 0   PHQ-2 Score 0   PHQ-2 Total Score (12-17 Years)- Positive if 3 or more points; Administer PHQ-A if positive -   Q1: Little interest or pleasure in doing things Not at all   Q2: Feeling down, depressed or hopeless Not at all   PHQ-2 Score 0       Abuse: Current or Past (Physical, Sexual or Emotional) - No  Do you feel safe in your environment? Yes    Have you ever done Advance Care Planning? (For example, a Health Directive, POLST, or a discussion with a medical provider or your loved ones about your wishes): Yes, patient states has an Advance Care Planning document and will bring a copy to the clinic.    Social History     Tobacco Use     Smoking status: Former Smoker     Packs/day: 0.25     Years: 3.00     Pack years: 0.75     Types: Cigarettes     Quit date: 1980     Years since quittin.7     Smokeless tobacco: Never Used   Substance Use Topics     Alcohol use: Yes     Alcohol/week: 1.0 standard drink     Comment: Alcoholic Drinks/day: On ocassions         Alcohol Use 2022   Prescreen: >3 drinks/day or >7 drinks/week? No       Reviewed orders with patient.  Reviewed health maintenance and updated orders accordingly - Yes  Current Outpatient Medications   Medication Sig Dispense Refill     calcium-vitamin D (CALCIUM-VITAMIN D) 500 mg(1,250mg) -200 unit per tablet [CALCIUM-VITAMIN D (CALCIUM-VITAMIN D) 500 MG(1,250MG) -200 UNIT PER TABLET] Take 1 tablet by mouth 2 (two) times a  day with meals.       estradiol (ESTRACE) 0.1 MG/GM vaginal cream [ESTRADIOL (ESTRACE) 0.01 % (0.1 MG/GRAM) VAGINAL CREAM] Use 0.5 gm weekly 42.5 g 5     hydrOXYzine (ATARAX) 25 MG tablet Take 25 mg by mouth       levothyroxine (SYNTHROID/LEVOTHROID) 88 MCG tablet Take 1 tablet (88 mcg) by mouth daily 90 tablet 3       Breast Cancer Screening:    FHS-7:   Breast CA Risk Assessment (S-7) 5/24/2022 9/26/2022   Did any of your first-degree relatives have breast or ovarian cancer? No Yes   Did any of your relatives have bilateral breast cancer? No Unknown   Did any man in your family have breast cancer? No No   Did any woman in your family have breast and ovarian cancer? No No   Did any woman in your family have breast cancer before age 50 y? No No   Do you have 2 or more relatives with breast and/or ovarian cancer? Yes Yes   Do you have 2 or more relatives with breast and/or bowel cancer? No Yes       Mammogram Screening: Recommended mammography every 1-2 years with patient discussion and risk factor consideration  Pertinent mammograms are reviewed under the imaging tab.    History of abnormal Pap smear: NO - age 30-65 PAP every 5 years with negative HPV co-testing recommended  PAP / HPV Latest Ref Rng & Units 7/10/2020 1/19/2015   PAP Negative for squamous intraepithelial lesion or malignancy. Negative for squamous intraepithelial lesion or malignancy  Electronically signed by Ruthann Grande CT (ASCP) on 7/16/2020 at  2:30 PM   Negative for squamous intraepithelial lesion or malignancy  Electronically signed by Ruthann Grande CT (ASCP) on 1/28/2015 at  3:38 PM     HPV16 NEG Negative -   HPV18 NEG Negative -   HRHPV NEG Negative -     Reviewed and updated as needed this visit by clinical staff   Tobacco  Allergies  Meds   Med Hx  Surg Hx  Fam Hx  Soc Hx          Reviewed and updated as needed this visit by Provider    Allergies  Meds                   Review of Systems   Constitutional: Negative for  "chills and fever.   HENT: Positive for ear pain. Negative for congestion, hearing loss and sore throat.    Eyes: Negative for pain and visual disturbance.   Respiratory: Negative for cough and shortness of breath.    Cardiovascular: Negative for chest pain, palpitations and peripheral edema.   Gastrointestinal: Negative for abdominal pain, constipation, diarrhea, heartburn, hematochezia and nausea.   Breasts:  Negative for tenderness, breast mass and discharge.   Genitourinary: Negative for dysuria, frequency, genital sores, hematuria, pelvic pain, urgency, vaginal bleeding and vaginal discharge.   Musculoskeletal: Negative for arthralgias, joint swelling and myalgias.   Skin: Negative for rash.   Neurological: Negative for dizziness, weakness, headaches and paresthesias.   Psychiatric/Behavioral: Negative for mood changes. The patient is nervous/anxious.           OBJECTIVE:   /78 (BP Location: Right arm, Cuff Size: Adult Regular)   Pulse 76   Temp 97.5  F (36.4  C) (Oral)   Ht 1.702 m (5' 7\")   Wt 63 kg (138 lb 12.8 oz)   SpO2 98%   BMI 21.74 kg/m    Physical Exam  GENERAL APPEARANCE: healthy, alert and no distress  EYES: Eyes grossly normal to inspection, PERRL and conjunctivae and sclerae normal  HENT: both ears: occluded with wax  RESP: lungs clear to auscultation - no rales, rhonchi or wheezes  BREAST: normal without masses, tenderness or nipple discharge and no palpable axillary masses or adenopathy  CV: regular rate and rhythm, normal S1 S2, no S3 or S4, no murmur, click or rub, no peripheral edema and peripheral pulses strong  ABDOMEN: soft, nontender, no hepatosplenomegaly, no masses and bowel sounds normal  MS: no musculoskeletal defects are noted and gait is age appropriate without ataxia  SKIN: no suspicious lesions or rashes  NEURO: Normal strength and tone, sensory exam grossly normal, mentation intact and speech normal  PSYCH: mentation appears normal and affect " "normal/bright        ASSESSMENT/PLAN:   Gloria was seen today for physical.    Diagnoses and all orders for this visit:    Routine history and physical examination of adult  -     Lipid panel reflex to direct LDL Fasting; Future  -     Glucose; Future  -     Hemoglobin; Future  -Pap smear up-to-date.  Next Pap due in 2025.  Mammogram due next May.  Colonoscopy due in 2023 due to history of polyps per DEXA scan will be due in 2023.  Vaccines up-to-date.  Check fasting lipids, glucose and hemoglobin.  Encouraged regular exercise and healthy diet.  Follow-up in 1 year for annual physical    Acquired hypothyroidism  -     levothyroxine (SYNTHROID/LEVOTHROID) 88 MCG tablet; Take 1 tablet (88 mcg) by mouth daily  -     TSH; Future  - She has upcoming appointment with endocrinology.  Could consider switching to brand-name Synthroid     Anxiety  Overall well controlled.  Continue hydroxyzine as needed    Bilateral impacted cerumen  Ear lavage performed today by CSS.    Other orders  -     REVIEW OF HEALTH MAINTENANCE PROTOCOL ORDERS            COUNSELING:  Reviewed preventive health counseling, as reflected in patient instructions    Estimated body mass index is 21.74 kg/m  as calculated from the following:    Height as of this encounter: 1.702 m (5' 7\").    Weight as of this encounter: 63 kg (138 lb 12.8 oz).        She reports that she quit smoking about 42 years ago. Her smoking use included cigarettes. She has a 0.75 pack-year smoking history. She has never used smokeless tobacco.      Counseling Resources:  ATP IV Guidelines  Pooled Cohorts Equation Calculator  Breast Cancer Risk Calculator  BRCA-Related Cancer Risk Assessment: FHS-7 Tool  FRAX Risk Assessment  ICSI Preventive Guidelines  Dietary Guidelines for Americans, 2010  USDA's MyPlate  ASA Prophylaxis  Lung CA Screening    Alisha Hale MD  [facility]  "

## 2022-10-07 ENCOUNTER — TELEPHONE (OUTPATIENT)
Dept: FAMILY MEDICINE | Facility: CLINIC | Age: 62
End: 2022-10-07

## 2022-10-07 DIAGNOSIS — E03.9 ACQUIRED HYPOTHYROIDISM: Primary | ICD-10-CM

## 2022-10-07 RX ORDER — LEVOTHYROXINE SODIUM 100 MCG
100 TABLET ORAL DAILY
Qty: 90 TABLET | Refills: 0 | Status: SHIPPED | OUTPATIENT
Start: 2022-10-07 | End: 2023-01-05

## 2022-10-07 NOTE — TELEPHONE ENCOUNTER
----- Message from Alisha Hale MD sent at 10/7/2022  7:53 AM CDT -----  Please contact pt with this message:   TSH is elevated again.  I would like to try the brand name Synthroid as I mentioned at your office visit.  I will send this to your pharmacy.  We will also look forward to recommendations from the endocrinologist at your upcoming appointment.

## 2022-10-21 ASSESSMENT — ENCOUNTER SYMPTOMS
NECK PAIN: 1
MUSCLE CRAMPS: 0
BACK PAIN: 0
JOINT SWELLING: 0
MUSCLE WEAKNESS: 0
ARTHRALGIAS: 0
MYALGIAS: 0
STIFFNESS: 0

## 2022-10-23 NOTE — PROGRESS NOTES
Video visit    Start time 9:00, stop time 9:23; additional 15 minutes spent on the date of the encounter doing chart review, documentation and further activities as noted.     Provider location: [facility], [address] [place] [address]    Patient location: patient home    Mode of transmission: video     Subjective:    New patient, no prior Endocrine notes including Care Everywhere     Gloria Myers is a 61 year old female who presents for hypothyroidism.     She suspects she was diagnosed with hypothyroidism on a screening test about 25 - 30 years ago. She has always been on LT4.     No history of hyperthyroidism. No known thyroid nodule. No prior thyroid biopsy. No prior thyroid surgery. No prior H/N radiation. No GILES therapy.    Her sister and mother likely have thyroid disease - details not known.     She was on LT4 88 mcg once daily 7 days/week. Then on 10/14/2022 she started Synthroid 100 mcg once daily 7 days/week.    She takes LT4 appropriately to maximize absorption. She misses a dose a few times per month (and doesn't subsequently double her dose the next day). She doesn't take any vitamins or minerals within 4 hours of taking thyroid hormone.          Reviewing her TFTS back to 2010 TSH has frequently been highly variable - both suppressed and significantly elevated.     No prior thyroid antibodies have been drawn.     1 prior thyroid US done 6/21/2022: I reviewed the images in detail and agree with the radiology read   -RIGHT lobe: 2.2 x 0.6 x 0.7 cm. Heterogeneous atrophic gland. No nodule.  -Isthmus: 1 mm.  -LEFT lobe: 2.4 x 1.0 x 0.5 cm. Heterogeneously atrophic gland. No nodule  -No LAD                                                                   5/2022: Celiac screen was negative although IgA was mildly low     No prior stomach or small bowel surgery. Rare diarrhea - non-bloody. No frequent abdominal pain.     No other relevant comorbidities.     No history of ocular  "symptoms that would be suggestive of NANCY.     No use of biotin.     Objective:    Video visit. 5'7\" and ~138 # (62.7 kg) and she notes gradually gaining weight and suspects she's gained about 10 # over the past 14 months with current BMI 21.6 kg/m2     62.7 x 1.6 = ~100    Assessment/Plan:    # Primary hypothyroidism    The fluctuations in her TSH over the years are surprising in the sense that she takes thyroid hormone appropriately to maximize absorption and denies frequent missed doses, and she does not have any features suggestive of malabsorption.    We will continue Synthroid 100 mcg once daily, 7 days a week.  She understands how to take this appropriately to maximize absorption.  I ordered a TSH in 2 months.  If she continues to have significant fluctuations in TSH we will switch to Tirosint.  "

## 2022-10-24 ENCOUNTER — VIRTUAL VISIT (OUTPATIENT)
Dept: ENDOCRINOLOGY | Facility: CLINIC | Age: 62
End: 2022-10-24
Attending: FAMILY MEDICINE
Payer: COMMERCIAL

## 2022-10-24 DIAGNOSIS — E03.9 HYPOTHYROIDISM, UNSPECIFIED TYPE: Primary | ICD-10-CM

## 2022-10-24 PROCEDURE — 99203 OFFICE O/P NEW LOW 30 MIN: CPT | Mod: 95 | Performed by: INTERNAL MEDICINE

## 2022-10-24 NOTE — LETTER
10/24/2022         RE: Gloria Myers  [address]  [place] [address]        Dear Colleague,    Thank you for referring your patient, Gloria Myers, to the [facility]. Please see a copy of my visit note below.    Video visit    Start time 9:00, stop time 9:23; additional 15 minutes spent on the date of the encounter doing chart review, documentation and further activities as noted.     Provider location: [facility], [address] [place], [place] [id number]    Patient location: patient home    Mode of transmission: video     Subjective:    New patient, no prior Endocrine notes including Care Everywhere     Gloria Myers is a 61 year old female who presents for hypothyroidism.     She suspects she was diagnosed with hypothyroidism on a screening test about 25 - 30 years ago. She has always been on LT4.     No history of hyperthyroidism. No known thyroid nodule. No prior thyroid biopsy. No prior thyroid surgery. No prior H/N radiation. No GILES therapy.    Her sister and mother likely have thyroid disease - details not known.     She was on LT4 88 mcg once daily 7 days/week. Then on 10/14/2022 she started Synthroid 100 mcg once daily 7 days/week.    She takes LT4 appropriately to maximize absorption. She misses a dose a few times per month (and doesn't subsequently double her dose the next day). She doesn't take any vitamins or minerals within 4 hours of taking thyroid hormone.          Reviewing her TFTS back to 2010 TSH has frequently been highly variable - both suppressed and significantly elevated.     No prior thyroid antibodies have been drawn.     1 prior thyroid US done 6/21/2022: I reviewed the images in detail and agree with the radiology read   -RIGHT lobe: 2.2 x 0.6 x 0.7 cm. Heterogeneous atrophic gland. No nodule.  -Isthmus: 1 mm.  -LEFT lobe: 2.4 x 1.0 x 0.5 cm. Heterogeneously atrophic gland. No nodule  -No LAD                                            "                        5/2022: Celiac screen was negative although IgA was mildly low     No prior stomach or small bowel surgery. Rare diarrhea - non-bloody. No frequent abdominal pain.     No other relevant comorbidities.     No history of ocular symptoms that would be suggestive of NANCY.     No use of biotin.     Objective:    Video visit. 5'7\" and ~138 # (62.7 kg) and she notes gradually gaining weight and suspects she's gained about 10 # over the past 14 months with current BMI 21.6 kg/m2     62.7 x 1.6 = ~100    Assessment/Plan:    # Primary hypothyroidism    The fluctuations in her TSH over the years are surprising in the sense that she takes thyroid hormone appropriately to maximize absorption and denies frequent missed doses, and she does not have any features suggestive of malabsorption.    We will continue Synthroid 100 mcg once daily, 7 days a week.  She understands how to take this appropriately to maximize absorption.  I ordered a TSH in 2 months.  If she continues to have significant fluctuations in TSH we will switch to Tirosint.      Again, thank you for allowing me to participate in the care of your patient.        Sincerely,        Larry Montoya MD    "

## 2022-10-26 ENCOUNTER — OFFICE VISIT (OUTPATIENT)
Dept: OTOLARYNGOLOGY | Facility: CLINIC | Age: 62
End: 2022-10-26
Payer: COMMERCIAL

## 2022-10-26 ENCOUNTER — OFFICE VISIT (OUTPATIENT)
Dept: AUDIOLOGY | Facility: CLINIC | Age: 62
End: 2022-10-26
Payer: COMMERCIAL

## 2022-10-26 DIAGNOSIS — H90.3 SENSORINEURAL HEARING LOSS, BILATERAL: Primary | ICD-10-CM

## 2022-10-26 DIAGNOSIS — H93.8X1 EAR FULLNESS, RIGHT: Primary | ICD-10-CM

## 2022-10-26 DIAGNOSIS — R44.8 FACIAL PRESSURE: ICD-10-CM

## 2022-10-26 DIAGNOSIS — J31.0 CHRONIC RHINITIS: ICD-10-CM

## 2022-10-26 DIAGNOSIS — H90.3 SENSORINEURAL HEARING LOSS (SNHL) OF BOTH EARS: ICD-10-CM

## 2022-10-26 PROCEDURE — 99204 OFFICE O/P NEW MOD 45 MIN: CPT | Performed by: OTOLARYNGOLOGY

## 2022-10-26 PROCEDURE — 92557 COMPREHENSIVE HEARING TEST: CPT | Performed by: AUDIOLOGIST

## 2022-10-26 PROCEDURE — 92567 TYMPANOMETRY: CPT | Performed by: AUDIOLOGIST

## 2022-10-26 RX ORDER — CIPROFLOXACIN AND DEXAMETHASONE 3; 1 MG/ML; MG/ML
4 SUSPENSION/ DROPS AURICULAR (OTIC) 2 TIMES DAILY
Qty: 2.8 ML | Refills: 0 | Status: SHIPPED | OUTPATIENT
Start: 2022-10-26 | End: 2022-11-02

## 2022-10-26 RX ORDER — IPRATROPIUM BROMIDE 42 UG/1
2 SPRAY, METERED NASAL 3 TIMES DAILY PRN
Qty: 30 ML | Refills: 11 | Status: SHIPPED | OUTPATIENT
Start: 2022-10-26

## 2022-10-26 NOTE — PROGRESS NOTES
CHIEF COMPLAINT: Patient presents with:  Ent Problem: Pressure on right side of face, pressure in right ear for years, Nasacort use         HISTORY OF PRESENT ILLNESS    Dot was seen at the behest of Alisha Hale MD for ear pressure/ fullness.  Patient has had this longstanding.  She has never been diagnosed with TMJ heard Krissy do occasionally lock.  Recent history of vertigo that was succesfully treated.  She is normal she has no history of allergies or sinus infections or sinus surgery.  One of her relatives recently was saw me and had an ear tube placed and she was wondering if an ear tube might help her.  Other concerns include chronic runny nose that is sometimes worse with exercise or going outdoors.  No history of seasonal allergies.       REVIEW OF SYSTEMS    Review of Systems as per HPI and PMHx, otherwise 10 system review system are negative.       ALLERGIES    Patient has no known allergies.    CURRENT MEDICATIONS      Current Outpatient Medications:      calcium-vitamin D (CALCIUM-VITAMIN D) 500 mg(1,250mg) -200 unit per tablet, [CALCIUM-VITAMIN D (CALCIUM-VITAMIN D) 500 MG(1,250MG) -200 UNIT PER TABLET] Take 1 tablet by mouth 2 (two) times a day with meals., Disp: , Rfl:      ciprofloxacin-dexamethasone (CIPRODEX) 0.3-0.1 % otic suspension, Place 4 drops Into the left ear 2 times daily for 7 days, Disp: 2.8 mL, Rfl: 0     estradiol (ESTRACE) 0.1 MG/GM vaginal cream, [ESTRADIOL (ESTRACE) 0.01 % (0.1 MG/GRAM) VAGINAL CREAM] Use 0.5 gm weekly, Disp: 42.5 g, Rfl: 5     hydrOXYzine (ATARAX) 25 MG tablet, Take 25 mg by mouth, Disp: , Rfl:      ipratropium (ATROVENT) 0.06 % nasal spray, Spray 2 sprays into both nostrils 3 times daily as needed for rhinitis, Disp: 30 mL, Rfl: 11     SYNTHROID 100 MCG tablet, Take 1 tablet (100 mcg) by mouth daily, Disp: 90 tablet, Rfl: 0     PAST MEDICAL HISTORY    PAST MEDICAL HISTORY:   Past Medical History:   Diagnosis Date     Anxiety      Cancer (H) 2019    Skin cancer      Fibroids      Hypothyroidism      Vaginal atrophy        PAST SURGICAL HISTORY    PAST SURGICAL HISTORY:   Past Surgical History:   Procedure Laterality Date     COLONOSCOPY       NM CYSTOURETHROSCOPY,FULGUR <0.5 CM ADRIANA      Description: Cystoscopy With Fulguration Minor Lesion (Under 5mm);  Recorded: 2012;       FAMILY  HISTORY    FAMILY HISTORY:   Family History   Problem Relation Age of Onset     Hypothyroidism Mother      Narcolepsy Mother      Asthma Mother      Heart Disease Father      Asthma Sister      Hypothyroidism Sister      Breast Cancer Maternal Grandmother         Unsure of age     Asthma Brother      No Known Problems Son      No Known Problems Son        SOCIAL HISTORY    SOCIAL HISTORY:   Social History     Tobacco Use     Smoking status: Former     Packs/day: 0.25     Years: 3.00     Pack years: 0.75     Types: Cigarettes     Quit date: 1980     Years since quittin.7     Smokeless tobacco: Never   Substance Use Topics     Alcohol use: Yes     Alcohol/week: 1.0 standard drink     Comment: Alcoholic Drinks/day: On ocassions        PHYSICAL EXAM    HEAD: Normal appearance and symmetry:  No cutaneous lesions.      NECK:  supple     EARS: normal TM, EACs    TMJs nontender; no crepitus      EYES:  EOMI    CN VII/XII:  intact     NOSE:     Dorsum:   straight  Septum:  midline  Mucosa:  moist        ORAL CAVITY/OROPHARYNX:     Lips:  Normal.  Tongue: normal, midline  Mucosa:   no lesions     NECK:  Trachea:  midline.              Thyroid:  normal              Adenopathy:  none        NEURO:   Alert and Oriented     GAIT AND STATION:  normal     RESPIRATORY:   Symmetry and Respiratory effort     PSYCH:  Normal mood and affect     SKIN:   warm and dry       AUDIOLOGY NOTE:    Gloria reports a hx of fullness/pressure on the right side of her face and in her right ear for the past several years. She was told  her R ear canal was collapsed several years ago, but hasn t been told again  recently. Reports she experienced vertigo a few weeks ago  which lasted a few days. She saw primary care and did an exercise and hasn t noticed vertigo since then. Reports her mother and siblings  have age-related HL. Denies otalgia, otorrhea, tinnitus, ear surgery, and noise exposure.  RESULTS: Otoscopy: clear canals bilat. Tymps: normal eardrum mobility bilat. Reflexes: CNT due to equipment. Audio: normal hearing  250-2000 Hz sloping to moderate sensorineural hearing loss in both ears. Word Rec: excellent bilat. SRTs in agreement with pure tones.  REC: F/U with ENT. Retest hearing as required for medical management. Consider hearing aids if motivated.    IMPRESSION:    Encounter Diagnoses   Name Primary?     Ear fullness, right Yes     Facial pressure      Chronic rhinitis      Sensorineural hearing loss (SNHL) of both ears         Suspect that her right-sided fullness may be related to    RECOMMENDATIONS:      Orders Placed This Encounter   Procedures     CT Sinus w/o Contrast     Adult Audiology  Referral      Conservative measures for TMJ.  I reviewed her audiogram and we discussed hearing loss and the need to monitor this.  She may quite consider hearing aids in the future.  I see no otologic reason for ear fullness.  I suspect this may be due to degeneration or inflammation of the temporomandibular joint.  We will go ahead and get a CT of the sinuses to rule out underlying sinus disease.  Trial of Atrovent nasal spray for vasomotor rhinitis.  Return visit annually.  CT results via [facility].

## 2022-10-26 NOTE — PROGRESS NOTES
AUDIOLOGY REPORT    SUMMARY: Audiology visit completed. See audiogram for results.     RECOMMENDATIONS: Follow-up with ENT.    Priscila Sanchez, [facility]  [place] Licensed Audiologist #[id number]

## 2022-10-26 NOTE — LETTER
10/26/2022         RE: Gloria Myers  [address] [place] [address]        Dear Colleague,    Thank you for referring your patient, Gloria Myers, to the [facility]. Please see a copy of my visit note below.    CHIEF COMPLAINT: Patient presents with:  Ent Problem: Pressure on right side of face, pressure in right ear for years, Nasacort use         HISTORY OF PRESENT ILLNESS    Dot was seen at the behest of Alisha Hale MD for ear pressure/ fullness.  Patient has had this longstanding.  She has never been diagnosed with TMJ heard Krissy do occasionally lock.  Recent history of vertigo that was succesfully treated.  She is normal she has no history of allergies or sinus infections or sinus surgery.  One of her relatives recently was saw me and had an ear tube placed and she was wondering if an ear tube might help her.  Other concerns include chronic runny nose that is sometimes worse with exercise or going outdoors.  No history of seasonal allergies.       REVIEW OF SYSTEMS    Review of Systems as per HPI and PMHx, otherwise 10 system review system are negative.       ALLERGIES    Patient has no known allergies.    CURRENT MEDICATIONS      Current Outpatient Medications:      calcium-vitamin D (CALCIUM-VITAMIN D) 500 mg(1,250mg) -200 unit per tablet, [CALCIUM-VITAMIN D (CALCIUM-VITAMIN D) 500 MG(1,250MG) -200 UNIT PER TABLET] Take 1 tablet by mouth 2 (two) times a day with meals., Disp: , Rfl:      ciprofloxacin-dexamethasone (CIPRODEX) 0.3-0.1 % otic suspension, Place 4 drops Into the left ear 2 times daily for 7 days, Disp: 2.8 mL, Rfl: 0     estradiol (ESTRACE) 0.1 MG/GM vaginal cream, [ESTRADIOL (ESTRACE) 0.01 % (0.1 MG/GRAM) VAGINAL CREAM] Use 0.5 gm weekly, Disp: 42.5 g, Rfl: 5     hydrOXYzine (ATARAX) 25 MG tablet, Take 25 mg by mouth, Disp: , Rfl:      ipratropium (ATROVENT) 0.06 % nasal spray, Spray 2 sprays into both nostrils 3 times daily as needed for rhinitis,  Disp: 30 mL, Rfl: 11     SYNTHROID 100 MCG tablet, Take 1 tablet (100 mcg) by mouth daily, Disp: 90 tablet, Rfl: 0     PAST MEDICAL HISTORY    PAST MEDICAL HISTORY:   Past Medical History:   Diagnosis Date     Anxiety      Cancer (H) 2019    Skin cancer     Fibroids      Hypothyroidism      Vaginal atrophy        PAST SURGICAL HISTORY    PAST SURGICAL HISTORY:   Past Surgical History:   Procedure Laterality Date     COLONOSCOPY       [place] CYSTOURETHROSCOPY,FULGUR <0.5 CM LESN      Description: Cystoscopy With Fulguration Minor Lesion (Under 5mm);  Recorded: 2012;       FAMILY  HISTORY    FAMILY HISTORY:   Family History   Problem Relation Age of Onset     Hypothyroidism Mother      Narcolepsy Mother      Asthma Mother      Heart Disease Father      Asthma Sister      Hypothyroidism Sister      Breast Cancer Maternal Grandmother         Unsure of age     Asthma Brother      No Known Problems Son      No Known Problems Son        SOCIAL HISTORY    SOCIAL HISTORY:   Social History     Tobacco Use     Smoking status: Former     Packs/day: 0.25     Years: 3.00     Pack years: 0.75     Types: Cigarettes     Quit date: 1980     Years since quittin.7     Smokeless tobacco: Never   Substance Use Topics     Alcohol use: Yes     Alcohol/week: 1.0 standard drink     Comment: Alcoholic Drinks/day: On ocassions        PHYSICAL EXAM    HEAD: Normal appearance and symmetry:  No cutaneous lesions.      NECK:  supple     EARS: normal TM, EACs    TMJs nontender; no crepitus      EYES:  EOMI    CN VII/XII:  intact     NOSE:     Dorsum:   straight  Septum:  midline  Mucosa:  moist        ORAL CAVITY/OROPHARYNX:     Lips:  Normal.  Tongue: normal, midline  Mucosa:   no lesions     NECK:  Trachea:  midline.              Thyroid:  normal              Adenopathy:  none        NEURO:   Alert and Oriented     GAIT AND STATION:  normal     RESPIRATORY:   Symmetry and Respiratory effort     PSYCH:  Normal mood and affect     SKIN:    warm and dry       AUDIOLOGY NOTE:    Gloria reports a hx of fullness/pressure on the right side of her face and in her right ear for the past several years. She was told  her R ear canal was collapsed several years ago, but hasn t been told again recently. Reports she experienced vertigo a few weeks ago  which lasted a few days. She saw primary care and did an exercise and hasn t noticed vertigo since then. Reports her mother and siblings  have age-related HL. Denies otalgia, otorrhea, tinnitus, ear surgery, and noise exposure.  RESULTS: Otoscopy: clear canals bilat. Tymps: normal eardrum mobility bilat. Reflexes: CNT due to equipment. Audio: normal hearing  250-2000 Hz sloping to moderate sensorineural hearing loss in both ears. Word Rec: excellent bilat. SRTs in agreement with pure tones.  REC: F/U with ENT. Retest hearing as required for medical management. Consider hearing aids if motivated.    IMPRESSION:    Encounter Diagnoses   Name Primary?     Ear fullness, right Yes     Facial pressure      Chronic rhinitis      Sensorineural hearing loss (SNHL) of both ears         Suspect that her right-sided fullness may be related to    RECOMMENDATIONS:      Orders Placed This Encounter   Procedures     CT Sinus w/o Contrast     Adult Audiology  Referral      Conservative measures for TMJ.  I reviewed her audiogram and we discussed hearing loss and the need to monitor this.  She may quite consider hearing aids in the future.  I see no otologic reason for ear fullness.  I suspect this may be due to degeneration or inflammation of the temporomandibular joint.  We will go ahead and get a CT of the sinuses to rule out underlying sinus disease.  Trial of Atrovent nasal spray for vasomotor rhinitis.  Return visit annually.  CT results via [facility].         Again, thank you for allowing me to participate in the care of your patient.        Sincerely,        Coy Balderas MD

## 2022-11-04 ENCOUNTER — HOSPITAL ENCOUNTER (OUTPATIENT)
Dept: CT IMAGING | Facility: HOSPITAL | Age: 62
Discharge: HOME | End: 2022-11-04
Attending: OTOLARYNGOLOGY | Admitting: OTOLARYNGOLOGY
Payer: COMMERCIAL

## 2022-11-04 DIAGNOSIS — R44.8 FACIAL PRESSURE: ICD-10-CM

## 2022-11-04 PROCEDURE — 70486 CT MAXILLOFACIAL W/O DYE: CPT

## 2022-11-24 ENCOUNTER — MYC REFILL (OUTPATIENT)
Dept: FAMILY MEDICINE | Facility: CLINIC | Age: 62
End: 2022-11-24

## 2022-11-24 DIAGNOSIS — F41.9 ANXIETY: Primary | ICD-10-CM

## 2022-11-25 RX ORDER — HYDROXYZINE HYDROCHLORIDE 25 MG/1
25 TABLET, FILM COATED ORAL EVERY 6 HOURS PRN
Qty: 30 TABLET | Refills: 3 | Status: SHIPPED | OUTPATIENT
Start: 2022-11-25 | End: 2023-11-07

## 2022-11-25 NOTE — TELEPHONE ENCOUNTER
"Outpatient Medication Detail     Disp Refills Start End LUCY   hydrOXYzine (ATARAX) 25 MG tablet   2/8/2021  --   Sig - Route: Take 25 mg by mouth - Oral   Class: Historical   Order: [id number]     Routing refill request to provider for review/approval because:  Medication is reported/historical    Last office visit provider:  10/3/22     Requested Prescriptions   Pending Prescriptions Disp Refills     hydrOXYzine (ATARAX) 25 MG tablet       Sig: Take 1 tablet (25 mg) by mouth       Antihistamines Protocol Passed - 11/25/2022 10:21 AM        Passed - Recent (12 mo) or future (30 days) visit within the authorizing provider's specialty     Patient has had an office visit with the authorizing provider or a provider within the authorizing providers department within the previous 12 mos or has a future within next 30 days. See \"Patient Info\" tab in inbasket, or \"Choose Columns\" in Meds & Orders section of the refill encounter.              Passed - Patient is age 3 or older     Apply age and/or weight-based dosing for peds patients age 3 and older.    Forward request to provider for patients under the age of 3.          Passed - Medication is active on med list             Dayton Knox RN 11/25/22 10:22 AM  "

## 2022-11-30 ENCOUNTER — LAB (OUTPATIENT)
Dept: LAB | Facility: CLINIC | Age: 62
End: 2022-11-30
Payer: COMMERCIAL

## 2022-11-30 DIAGNOSIS — E03.9 HYPOTHYROIDISM, UNSPECIFIED TYPE: ICD-10-CM

## 2022-11-30 LAB
T4 FREE SERPL-MCNC: 1.9 NG/DL (ref 0.9–1.7)
TSH SERPL DL<=0.005 MIU/L-ACNC: 0.08 UIU/ML (ref 0.3–4.2)

## 2022-11-30 PROCEDURE — 84439 ASSAY OF FREE THYROXINE: CPT

## 2022-11-30 PROCEDURE — 36415 COLL VENOUS BLD VENIPUNCTURE: CPT

## 2022-11-30 PROCEDURE — 84443 ASSAY THYROID STIM HORMONE: CPT

## 2022-11-30 PROCEDURE — 86376 MICROSOMAL ANTIBODY EACH: CPT

## 2022-12-01 DIAGNOSIS — E03.9 HYPOTHYROIDISM, UNSPECIFIED TYPE: Primary | ICD-10-CM

## 2022-12-01 LAB — THYROPEROXIDASE AB SERPL-ACNC: <10 IU/ML

## 2023-01-05 DIAGNOSIS — E03.9 ACQUIRED HYPOTHYROIDISM: ICD-10-CM

## 2023-01-05 RX ORDER — LEVOTHYROXINE SODIUM 100 MCG
TABLET ORAL
Qty: 90 TABLET | Refills: 3 | Status: SHIPPED | OUTPATIENT
Start: 2023-01-05 | End: 2024-03-08

## 2023-01-05 NOTE — TELEPHONE ENCOUNTER
"Routing refill request to provider for review/approval because:  Labs out of range:  TSH    Last Written Prescription Date:  10/7/22  Last Fill Quantity: 90,  # refills: 0   Last office visit provider:   10/3/22    Requested Prescriptions   Pending Prescriptions Disp Refills     SYNTHROID 100 MCG tablet [Pharmacy Med Name: SYNTHROID 100 MCG TABLET] 90 tablet 0     Sig: TAKE 1 TABLET BY MOUTH EVERY DAY       Thyroid Protocol Failed - 1/5/2023 12:29 AM        Failed - Normal TSH on file in past 12 months     Recent Labs   Lab Test 11/30/22  0757   TSH 0.08*              Passed - Patient is 12 years or older        Passed - Recent (12 mo) or future (30 days) visit within the authorizing provider's specialty     Patient has had an office visit with the authorizing provider or a provider within the authorizing providers department within the previous 12 mos or has a future within next 30 days. See \"Patient Info\" tab in inbasket, or \"Choose Columns\" in Meds & Orders section of the refill encounter.              Passed - Medication is active on med list        Passed - No active pregnancy on record     If patient is pregnant or has had a positive pregnancy test, please check TSH.          Passed - No positive pregnancy test in past 12 months     If patient is pregnant or has had a positive pregnancy test, please check TSH.               Ana Hess RN 01/05/23 2:15 PM  "

## 2023-03-03 ENCOUNTER — LAB (OUTPATIENT)
Dept: LAB | Facility: CLINIC | Age: 63
End: 2023-03-03
Payer: COMMERCIAL

## 2023-03-03 DIAGNOSIS — E03.9 HYPOTHYROIDISM, UNSPECIFIED TYPE: ICD-10-CM

## 2023-03-03 LAB
T4 FREE SERPL-MCNC: 1.69 NG/DL (ref 0.9–1.7)
TSH SERPL DL<=0.005 MIU/L-ACNC: 0.11 UIU/ML (ref 0.3–4.2)

## 2023-03-03 PROCEDURE — 84439 ASSAY OF FREE THYROXINE: CPT

## 2023-03-03 PROCEDURE — 36415 COLL VENOUS BLD VENIPUNCTURE: CPT

## 2023-03-03 PROCEDURE — 84443 ASSAY THYROID STIM HORMONE: CPT

## 2023-04-13 ENCOUNTER — TRANSFERRED RECORDS (OUTPATIENT)
Dept: HEALTH INFORMATION MANAGEMENT | Facility: CLINIC | Age: 63
End: 2023-04-13
Payer: COMMERCIAL

## 2023-05-23 ENCOUNTER — PATIENT OUTREACH (OUTPATIENT)
Dept: CARE COORDINATION | Facility: CLINIC | Age: 63
End: 2023-05-23
Payer: COMMERCIAL

## 2023-05-31 ENCOUNTER — ANCILLARY PROCEDURE (OUTPATIENT)
Dept: MAMMOGRAPHY | Facility: CLINIC | Age: 63
End: 2023-05-31
Attending: FAMILY MEDICINE
Payer: COMMERCIAL

## 2023-05-31 DIAGNOSIS — Z12.31 VISIT FOR SCREENING MAMMOGRAM: ICD-10-CM

## 2023-05-31 PROCEDURE — 77067 SCR MAMMO BI INCL CAD: CPT

## 2023-09-05 ENCOUNTER — PATIENT OUTREACH (OUTPATIENT)
Dept: CARE COORDINATION | Facility: CLINIC | Age: 63
End: 2023-09-05
Payer: COMMERCIAL

## 2023-11-04 DIAGNOSIS — F41.9 ANXIETY: ICD-10-CM

## 2023-11-07 RX ORDER — HYDROXYZINE HYDROCHLORIDE 25 MG/1
25 TABLET, FILM COATED ORAL EVERY 6 HOURS
Qty: 30 TABLET | Refills: 0 | Status: SHIPPED | OUTPATIENT
Start: 2023-11-07 | End: 2024-01-16

## 2023-11-13 DIAGNOSIS — E03.9 HYPOTHYROIDISM, UNSPECIFIED TYPE: Primary | ICD-10-CM

## 2023-11-15 ENCOUNTER — LAB (OUTPATIENT)
Dept: LAB | Facility: CLINIC | Age: 63
End: 2023-11-15
Payer: COMMERCIAL

## 2023-11-15 DIAGNOSIS — E03.9 HYPOTHYROIDISM, UNSPECIFIED TYPE: ICD-10-CM

## 2023-11-15 LAB
T4 FREE SERPL-MCNC: 1.66 NG/DL (ref 0.9–1.7)
TSH SERPL DL<=0.005 MIU/L-ACNC: 0.05 UIU/ML (ref 0.3–4.2)

## 2023-11-15 PROCEDURE — 36415 COLL VENOUS BLD VENIPUNCTURE: CPT

## 2023-11-15 PROCEDURE — 84443 ASSAY THYROID STIM HORMONE: CPT

## 2023-11-15 PROCEDURE — 84439 ASSAY OF FREE THYROXINE: CPT

## 2023-11-16 ENCOUNTER — HOSPITAL ENCOUNTER (EMERGENCY)
Facility: HOSPITAL | Age: 63
Discharge: HOME | End: 2023-11-16
Attending: EMERGENCY MEDICINE | Admitting: EMERGENCY MEDICINE
Payer: COMMERCIAL

## 2023-11-16 VITALS
OXYGEN SATURATION: 98 % | HEIGHT: 66 IN | TEMPERATURE: 98.3 F | HEART RATE: 74 BPM | WEIGHT: 140 LBS | RESPIRATION RATE: 27 BRPM | DIASTOLIC BLOOD PRESSURE: 60 MMHG | SYSTOLIC BLOOD PRESSURE: 126 MMHG | BODY MASS INDEX: 22.5 KG/M2

## 2023-11-16 DIAGNOSIS — R00.2 PALPITATIONS: ICD-10-CM

## 2023-11-16 DIAGNOSIS — I47.19 NARROW COMPLEX TACHYCARDIA (H): ICD-10-CM

## 2023-11-16 LAB
ALBUMIN SERPL BCG-MCNC: 4.8 G/DL (ref 3.5–5.2)
ALP SERPL-CCNC: 65 U/L (ref 40–150)
ALT SERPL W P-5'-P-CCNC: 22 U/L (ref 0–50)
ANION GAP SERPL CALCULATED.3IONS-SCNC: 9 MMOL/L (ref 7–15)
AST SERPL W P-5'-P-CCNC: 31 U/L (ref 0–45)
BASOPHILS # BLD AUTO: 0 10E3/UL (ref 0–0.2)
BASOPHILS NFR BLD AUTO: 0 %
BILIRUB SERPL-MCNC: 0.5 MG/DL
BUN SERPL-MCNC: 19.6 MG/DL (ref 8–23)
CALCIUM SERPL-MCNC: 9.9 MG/DL (ref 8.8–10.2)
CHLORIDE SERPL-SCNC: 106 MMOL/L (ref 98–107)
CREAT SERPL-MCNC: 0.87 MG/DL (ref 0.51–0.95)
DEPRECATED HCO3 PLAS-SCNC: 27 MMOL/L (ref 22–29)
EGFRCR SERPLBLD CKD-EPI 2021: 74 ML/MIN/1.73M2
EOSINOPHIL # BLD AUTO: 0.1 10E3/UL (ref 0–0.7)
EOSINOPHIL NFR BLD AUTO: 2 %
ERYTHROCYTE [DISTWIDTH] IN BLOOD BY AUTOMATED COUNT: 11.5 % (ref 10–15)
GLUCOSE SERPL-MCNC: 115 MG/DL (ref 70–99)
HCT VFR BLD AUTO: 40.2 % (ref 35–47)
HGB BLD-MCNC: 13.6 G/DL (ref 11.7–15.7)
IMM GRANULOCYTES # BLD: 0 10E3/UL
IMM GRANULOCYTES NFR BLD: 0 %
LYMPHOCYTES # BLD AUTO: 2 10E3/UL (ref 0.8–5.3)
LYMPHOCYTES NFR BLD AUTO: 37 %
MAGNESIUM SERPL-MCNC: 2.2 MG/DL (ref 1.7–2.3)
MCH RBC QN AUTO: 31.3 PG (ref 26.5–33)
MCHC RBC AUTO-ENTMCNC: 33.8 G/DL (ref 31.5–36.5)
MCV RBC AUTO: 93 FL (ref 78–100)
MONOCYTES # BLD AUTO: 0.5 10E3/UL (ref 0–1.3)
MONOCYTES NFR BLD AUTO: 9 %
NEUTROPHILS # BLD AUTO: 2.8 10E3/UL (ref 1.6–8.3)
NEUTROPHILS NFR BLD AUTO: 52 %
NRBC # BLD AUTO: 0 10E3/UL
NRBC BLD AUTO-RTO: 0 /100
PLATELET # BLD AUTO: 191 10E3/UL (ref 150–450)
POTASSIUM SERPL-SCNC: 3.9 MMOL/L (ref 3.4–5.3)
PROT SERPL-MCNC: 6.7 G/DL (ref 6.4–8.3)
RBC # BLD AUTO: 4.34 10E6/UL (ref 3.8–5.2)
SODIUM SERPL-SCNC: 142 MMOL/L (ref 135–145)
T4 FREE SERPL-MCNC: 1.56 NG/DL (ref 0.9–1.7)
TROPONIN T SERPL HS-MCNC: 7 NG/L
TSH SERPL DL<=0.005 MIU/L-ACNC: 0.06 UIU/ML (ref 0.3–4.2)
WBC # BLD AUTO: 5.4 10E3/UL (ref 4–11)

## 2023-11-16 PROCEDURE — 93005 ELECTROCARDIOGRAM TRACING: CPT | Performed by: STUDENT IN AN ORGANIZED HEALTH CARE EDUCATION/TRAINING PROGRAM

## 2023-11-16 PROCEDURE — 84443 ASSAY THYROID STIM HORMONE: CPT | Performed by: EMERGENCY MEDICINE

## 2023-11-16 PROCEDURE — 83735 ASSAY OF MAGNESIUM: CPT | Performed by: EMERGENCY MEDICINE

## 2023-11-16 PROCEDURE — 85025 COMPLETE CBC W/AUTO DIFF WBC: CPT | Performed by: EMERGENCY MEDICINE

## 2023-11-16 PROCEDURE — 80053 COMPREHEN METABOLIC PANEL: CPT | Performed by: EMERGENCY MEDICINE

## 2023-11-16 PROCEDURE — 99284 EMERGENCY DEPT VISIT MOD MDM: CPT | Mod: 25

## 2023-11-16 PROCEDURE — 84484 ASSAY OF TROPONIN QUANT: CPT | Performed by: EMERGENCY MEDICINE

## 2023-11-16 PROCEDURE — 96360 HYDRATION IV INFUSION INIT: CPT

## 2023-11-16 PROCEDURE — 36415 COLL VENOUS BLD VENIPUNCTURE: CPT | Performed by: EMERGENCY MEDICINE

## 2023-11-16 PROCEDURE — 258N000003 HC RX IP 258 OP 636: Performed by: EMERGENCY MEDICINE

## 2023-11-16 PROCEDURE — 84439 ASSAY OF FREE THYROXINE: CPT | Performed by: EMERGENCY MEDICINE

## 2023-11-16 PROCEDURE — 93005 ELECTROCARDIOGRAM TRACING: CPT | Performed by: EMERGENCY MEDICINE

## 2023-11-16 RX ORDER — METOPROLOL TARTRATE 25 MG/1
25 TABLET, FILM COATED ORAL 2 TIMES DAILY
Qty: 40 TABLET | Refills: 0 | Status: SHIPPED | OUTPATIENT
Start: 2023-11-16 | End: 2023-11-30

## 2023-11-16 RX ADMIN — SODIUM CHLORIDE 1000 ML: 9 INJECTION, SOLUTION INTRAVENOUS at 15:33

## 2023-11-16 ASSESSMENT — ACTIVITIES OF DAILY LIVING (ADL): ADLS_ACUITY_SCORE: 33

## 2023-11-16 NOTE — ED TRIAGE NOTES
"Patient reports \"heart racing\" she takes anxiety medications helps but today it does not. HR 94 in triage     Here with       Triage Assessment (Adult)       Row Name 11/16/23 1439          Triage Assessment    Airway WDL WDL        Respiratory WDL    Respiratory WDL WDL        Skin Circulation/Temperature WDL    Skin Circulation/Temperature WDL WDL        Cardiac WDL    Cardiac WDL X;rhythm  subjective heart racing                     "

## 2023-11-16 NOTE — DISCHARGE INSTRUCTIONS
You were seen in the emergency department for episodic palpitations.  Your evaluation included lab testing which has been reassuring.  We did complete an EKG which captured a limited episode of what we think could have been a supraventricular tachycardia (SVT).  This seems to have settled down here after some IV fluid.  We are going to prescribe you a low-dose of a medication called metoprolol which might help keep your heart rhythm and rate a little better controlled.  We are also going to refer you over to our rapid access cardiology clinic to follow-up after this ED visit.  They might want to consider additional testing like an event monitor and can help you clarify your diagnosis and treatment plan.  A  should be reaching out to you to help you make this appointment, if you do not hear anything by later in the day tomorrow, you can call them yourself using the information above.  We can tolerate occasional short episodes of palpitations at home until your follow-up.  If you have severe sustained rapid heart rate for multiple hours, lightheadedness, or other immediate concern we should reevaluate in the emergency department right away.

## 2023-11-16 NOTE — ED PROVIDER NOTES
EMERGENCY DEPARTMENT ENCOUNTER      NAME: Gloria Myers  AGE: 63 year old female  YOB: 1960  MRN: [id number]  EVALUATION DATE & TIME: No admission date for patient encounter.    PCP: Alisha Hale    ED PROVIDER: El Don M.D.      Chief Complaint   Patient presents with    Tachycardia         FINAL IMPRESSION:  1. Narrow complex tachycardia    2. Palpitations          ED COURSE & MEDICAL DECISION MAKIN year old female presents to the Emergency Department for evaluation of episodic palpitations over the last couple days.  Patient has a history of hypothyroidism, has been on a steady dose of Synthroid for the last 2 years.  Presents with 2 days of episodic palpitations.  In triage her vital signs are stable.  Her initial EKG from triage does capture an episode of regular narrow complex tachycardia, looks consistent with a very limited episode of SVT potentially.  She was having episodic very limited tachycardia while I was listening to her in triage to start her work-up.  Patient underwent lab evaluations as below which revealed a stable metabolic profile.  She does have a suppressed TSH however a normal T4 so I do not think she is thyrotoxic.  Patient received some IV fluids.  After this and upon being roomed on telemetry here her heart rate and rhythm had normalized, she was maintained in sinus rhythm for about  an hour of continuous cardiac monitoring here.  We discussed that her diagnosis remains a little bit unclear but I would consider something like paroxysmal SVT most likely.  I am going to refer her to rapid access cardiology clinic.  After discussion of risks and benefits we elected to start her on a low-dose of metoprolol 25 mg twice daily for now and have her increase her liquid intake in the hopes of controlling further palpitations.  We discussed that she will need some additional work-up at the direction of cardiology and reviewed return precautions for any severe  sustained tachycardia, lightheadedness, or other immediate concern.  Patient was discharged in stable condition.    3:00 PM I met with the patient to gather history and to perform my initial exam. I discussed the plan fo care while in the Emergency Department.     At the conclusion of the encounter I discussed the results of all of the tests and the disposition. The questions were answered. The patient or family acknowledged understanding and was agreeable with the care plan.       Medical Decision Making    History:  Supplemental history from: Documented in chart, if applicable  External Record(s) reviewed: Documented in chart, if applicable.    Work Up:  Chart documentation includes differential considered and any EKGs or imaging independently interpreted by provider, where specified.  In additional to work up documented, I considered the following work up: Documented in chart, if applicable.    External consultation:  Discussion of management with another provider: Documented in chart, if applicable    Complicating factors:  Care impacted by chronic illness: Hypothyroidism  Care affected by social determinants of health: Access to Medical Care    Disposition considerations: Discharge. I prescribed additional prescription strength medication(s) as charted. See documentation for any additional details.            MEDICATIONS GIVEN IN THE EMERGENCY:  Medications   sodium chloride 0.9% BOLUS 1,000 mL (0 mLs Intravenous Stopped 11/16/23 1613)       NEW PRESCRIPTIONS STARTED AT TODAY'S ER VISIT  New Prescriptions    METOPROLOL TARTRATE (LOPRESSOR) 25 MG TABLET    Take 1 tablet (25 mg) by mouth 2 times daily for 20 days          =================================================================    HPI    Patient information was obtained from: patient    Use of : N/A         Gloria Myers is a 63 year old female with a pertinent history of squamous cell carcinoma who presents to this ED walk-in for evaluation  of tachycardia. Patient reports intermittent palpitations for the past week with brief durations. She arrives at the ED due to increase frequency of the palpitations. She has a history of anxiety and took medication yesterday which helped alleviate her palpitations last night. She notes they returned this morning and has a few episodes every hour. Patient had her thyroid checked yesterday and reports that the tests came back normal. Endorses additional lightheadedness but has had this symptom for quite some time. Denies chest pain, shortness of breath.       REVIEW OF SYSTEMS   All systems reviewed and negative except as noted in HPI.    PAST MEDICAL HISTORY:  Past Medical History:   Diagnosis Date    Anxiety     Cancer (H) 2019    Skin cancer    Fibroids     Hypothyroidism     Vaginal atrophy        PAST SURGICAL HISTORY:  Past Surgical History:   Procedure Laterality Date    COLONOSCOPY      [place] CYSTOURETHROSCOPY,FULGUR <0.5 CM ADRIANA      Description: Cystoscopy With Fulguration Minor Lesion (Under 5mm);  Recorded: 12/31/2012;           CURRENT MEDICATIONS:    No current facility-administered medications for this encounter.     Current Outpatient Medications   Medication    metoprolol tartrate (LOPRESSOR) 25 MG tablet    calcium-vitamin D (CALCIUM-VITAMIN D) 500 mg(1,250mg) -200 unit per tablet    estradiol (ESTRACE) 0.1 MG/GM vaginal cream    hydrOXYzine (ATARAX) 25 MG tablet    ipratropium (ATROVENT) 0.06 % nasal spray    SYNTHROID 100 MCG tablet         ALLERGIES:  No Known Allergies    FAMILY HISTORY:  Family History   Problem Relation Age of Onset    Hypothyroidism Mother     Narcolepsy Mother     Asthma Mother     Heart Disease Father     Asthma Sister     Hypothyroidism Sister     Breast Cancer Maternal Grandmother         Unsure of age    Asthma Brother     No Known Problems Son     No Known Problems Son        SOCIAL HISTORY:   Social History     Socioeconomic History    Marital status:    Tobacco  "Use    Smoking status: Former     Packs/day: 0.25     Years: 3.00     Additional pack years: 0.00     Total pack years: 0.75     Types: Cigarettes     Quit date: 1980     Years since quittin.8    Smokeless tobacco: Never   Substance and Sexual Activity    Alcohol use: Yes     Alcohol/week: 1.0 standard drink of alcohol     Comment: Alcoholic Drinks/day: On ocassions    Drug use: No    Sexual activity: Not Currently     Partners: Male     Birth control/protection: Post-menopausal   Other Topics Concern    Parent/sibling w/ CABG, MI or angioplasty before 65F 55M? No   Social History Narrative    Lives .       VITALS:  /62   Pulse 74   Temp 98.3  F (36.8  C) (Temporal)   Resp 29   Ht 1.676 m (5' 6\")   Wt 63.5 kg (140 lb)   SpO2 98%   BMI 22.60 kg/m      PHYSICAL EXAM    Constitutional: Well developed, Well nourished, NAD.  HENT: Normocephalic, Atraumatic. Neck Supple.  Eyes: EOMI, Conjunctiva normal.  Respiratory: Breathing comfortably on room air. Speaks full sentences easily. Lungs clear to ascultation.  Cardiovascular: Irregular heart rate with occasional tachycardia, Regular rhythm. No peripheral edema.  Abdomen: Soft, nontender  Musculoskeletal: Good range of motion in all major joints. No major deformities noted.  Integument: Warm, Dry.  Neurologic: Alert & awake, Normal motor function, Normal sensory function, No focal deficits noted.   Psychiatric: Cooperative. Affect appropriate.     LAB:  All pertinent labs reviewed and interpreted.  Labs Ordered and Resulted from Time of ED Arrival to Time of ED Departure   COMPREHENSIVE METABOLIC PANEL - Abnormal       Result Value    Sodium 142      Potassium 3.9      Carbon Dioxide (CO2) 27      Anion Gap 9      Urea Nitrogen 19.6      Creatinine 0.87      GFR Estimate 74      Calcium 9.9      Chloride 106      Glucose 115 (*)     Alkaline Phosphatase 65      AST 31      ALT 22      Protein Total 6.7      Albumin 4.8      Bilirubin Total 0.5  "    TSH WITH FREE T4 REFLEX - Abnormal    TSH 0.06 (*)    MAGNESIUM - Normal    Magnesium 2.2     TROPONIN T, HIGH SENSITIVITY - Normal    Troponin T, High Sensitivity 7     T4 FREE - Normal    Free T4 1.56     CBC WITH PLATELETS AND DIFFERENTIAL    WBC Count 5.4      RBC Count 4.34      Hemoglobin 13.6      Hematocrit 40.2      MCV 93      MCH 31.3      MCHC 33.8      RDW 11.5      Platelet Count 191      % Neutrophils 52      % Lymphocytes 37      % Monocytes 9      % Eosinophils 2      % Basophils 0      % Immature Granulocytes 0      NRBCs per 100 WBC 0      Absolute Neutrophils 2.8      Absolute Lymphocytes 2.0      Absolute Monocytes 0.5      Absolute Eosinophils 0.1      Absolute Basophils 0.0      Absolute Immature Granulocytes 0.0      Absolute NRBCs 0.0           EKG:    Performed at: 1440      Impression: Sinus rhythm with an limited episode of narrow complex tachycardia which appears likely SVT    Rate: 104  Rhythm: Sinus, limited episode of narrow complex tachycardia likely SVT  Axis: Normal  ND Interval: 114  QRS Interval: 70  QTc Interval: 512  ST Changes: None significant  Comparison: None available    I have independently reviewed and interpreted the EKG(s) documented above.        I, Juan Medrano , am serving as a scribe to document services personally performed by Dr. El Don, based on my observation and the provider's statements to me. I, El Don MD attest that Juan Medrano  is acting in a scribe capacity, has observed my performance of the services and has documented them in accordance with my direction.    El Don M.D.  Emergency Medicine  [facility] EMERGENCY DEPARTMENT  [address] [place] [id number] [phone]  Dept: [phone]       El Don MD  11/16/23 8123

## 2023-11-20 LAB
ATRIAL RATE - MUSE: 131 BPM
DIASTOLIC BLOOD PRESSURE - MUSE: NORMAL MMHG
INTERPRETATION ECG - MUSE: NORMAL
P AXIS - MUSE: NORMAL DEGREES
PR INTERVAL - MUSE: 114 MS
QRS DURATION - MUSE: 70 MS
QT - MUSE: 390 MS
QTC - MUSE: 512 MS
R AXIS - MUSE: 80 DEGREES
SYSTOLIC BLOOD PRESSURE - MUSE: NORMAL MMHG
T AXIS - MUSE: 55 DEGREES
VENTRICULAR RATE- MUSE: 104 BPM

## 2023-11-30 ENCOUNTER — OFFICE VISIT (OUTPATIENT)
Dept: CARDIOLOGY | Facility: CLINIC | Age: 63
End: 2023-11-30
Attending: EMERGENCY MEDICINE
Payer: COMMERCIAL

## 2023-11-30 VITALS
DIASTOLIC BLOOD PRESSURE: 76 MMHG | BODY MASS INDEX: 21.31 KG/M2 | RESPIRATION RATE: 16 BRPM | SYSTOLIC BLOOD PRESSURE: 124 MMHG | OXYGEN SATURATION: 98 % | HEART RATE: 78 BPM | WEIGHT: 132 LBS

## 2023-11-30 DIAGNOSIS — R00.2 PALPITATIONS: ICD-10-CM

## 2023-11-30 DIAGNOSIS — I47.19 NARROW COMPLEX TACHYCARDIA (H): ICD-10-CM

## 2023-11-30 PROCEDURE — 99204 OFFICE O/P NEW MOD 45 MIN: CPT | Performed by: INTERNAL MEDICINE

## 2023-11-30 RX ORDER — METOPROLOL TARTRATE 25 MG/1
25 TABLET, FILM COATED ORAL 2 TIMES DAILY
Qty: 60 TABLET | Refills: 11 | Status: SHIPPED | OUTPATIENT
Start: 2023-11-30

## 2023-11-30 NOTE — LETTER
11/30/2023    Alisha Hale MD  [facility] [address]  [place] [address]    RE: Gloria Myers       Dear Colleague,     I had the pleasure of seeing Gloria Myers in the [facility].    HEART CARE ENCOUNTER CONSULTATON NOTE      [facility]  [phone]      Assessment/Recommendations   Assessment/Plan:    Gloria Myers is a very pleasant 63 year old female with PMH of hypothyroidism who presents today to the EP clinic.    SVT  - likely AT  - on metoprolol since ER visit  - symptoms well controlled  - will continue the medication for now and see in 6 months    Time spent: 45 minutes spent on the date of the encounter doing chart review, history and exam, documentation and further activities as noted above.         History of Present Illness/Subjective    HPI: Gloria Myers is a very pleasant 63 year old female with PMH of hypothyroidism who presents today to the EP clinic.    Gloria was recently seen in the ER for palpitations which appear to be short runs of SVT, likely AT. She was started on Metoprolol and discharged home. She has not had any symptoms since starting the medication.    She has had palpitations and a feeling of anxiety which started about 4 weeks ago. They occur several times a day and longest lasting episode was 30 minutes. No lightheadedness or syncope with the palpitations.    2 cups of coffee a day.  Wine 4 times a week.    She lost her son in May and has understandably been in a lot of stress and grief since then.    Labs below reviewed personally     Physical Examination  Review of Systems   Vitals: /76 (BP Location: Right arm, Patient Position: Sitting, Cuff Size: Adult Regular)   Pulse 78   Resp 16   Wt 59.9 kg (132 lb)   SpO2 98%   BMI 21.31 kg/m    BMI= Body mass index is 21.31 kg/m .  Wt Readings from Last 3 Encounters:   11/30/23 59.9 kg (132 lb)   11/16/23 63.5 kg (140 lb)   10/03/22 63 kg (138 lb 12.8 oz)        General Appearance:   no distress, normal body habitus   ENT/Mouth: membranes moist, no oral lesions or bleeding gums.      EYES:  no scleral icterus, normal conjunctivae   Neck: no carotid bruits or thyromegaly   Chest/Lungs:   lungs are clear to auscultation, no rales or wheezing,  sternal scar, equal chest wall expansion    Cardiovascular:   Regular. Normal first and second heart sounds with no murmurs, rubs, or gallops; the carotid, radial and posterior tibial pulses are intact, no edema bilaterally    Abdomen:  no organomegaly, masses, bruits, or tenderness; bowel sounds are present   Extremities: no cyanosis or clubbing   Skin: no xanthelasma, warm.    Neurologic: normal  bilateral, no tremors     Psychiatric: alert and oriented x3, calm        Please refer above for cardiac ROS details.        Medical History  Surgical History Family History Social History   Past Medical History:   Diagnosis Date    Anxiety     Cancer (H) 2019    Skin cancer    Fibroids     Hypothyroidism     Vaginal atrophy      Past Surgical History:   Procedure Laterality Date    COLONOSCOPY      VT CYSTOURETHROSCOPY,FULGUR <0.5 CM LESN      Description: Cystoscopy With Fulguration Minor Lesion (Under 5mm);  Recorded: 12/31/2012;     Family History   Problem Relation Age of Onset    Hypothyroidism Mother     Narcolepsy Mother     Asthma Mother     Heart Disease Father     Asthma Sister     Hypothyroidism Sister     Breast Cancer Maternal Grandmother         Unsure of age    Asthma Brother     No Known Problems Son     No Known Problems Son         Social History     Socioeconomic History    Marital status:      Spouse name: Not on file    Number of children: Not on file    Years of education: Not on file    Highest education level: Not on file   Occupational History    Not on file   Tobacco Use    Smoking status: Former     Packs/day: 0.25     Years: 3.00     Additional pack years: 0.00     Total pack years: 0.75      Types: Cigarettes     Quit date: 1980     Years since quittin.8    Smokeless tobacco: Never   Substance and Sexual Activity    Alcohol use: Yes     Alcohol/week: 1.0 standard drink of alcohol     Comment: Alcoholic Drinks/day: On ocassions    Drug use: No    Sexual activity: Not Currently     Partners: Male     Birth control/protection: Post-menopausal   Other Topics Concern    Parent/sibling w/ CABG, MI or angioplasty before 65F 55M? No   Social History Narrative    Lives .     Social Determinants of Health     Financial Resource Strain: Not on file   Food Insecurity: Not on file   Transportation Needs: Not on file   Physical Activity: Not on file   Stress: Not on file   Social Connections: Not on file   Interpersonal Safety: Not on file   Housing Stability: Not on file           Medications  Allergies   Current Outpatient Medications   Medication Sig Dispense Refill    calcium-vitamin D (CALCIUM-VITAMIN D) 500 mg(1,250mg) -200 unit per tablet [CALCIUM-VITAMIN D (CALCIUM-VITAMIN D) 500 MG(1,250MG) -200 UNIT PER TABLET] Take 1 tablet by mouth 2 (two) times a day with meals.      estradiol (ESTRACE) 0.1 MG/GM vaginal cream [ESTRADIOL (ESTRACE) 0.01 % (0.1 MG/GRAM) VAGINAL CREAM] Use 0.5 gm weekly 42.5 g 5    hydrOXYzine (ATARAX) 25 MG tablet TAKE 1 TABLET BY MOUTH EVERY 6 HOURS AS NEEDED FOR ANXIETY 30 tablet 0    ipratropium (ATROVENT) 0.06 % nasal spray Spray 2 sprays into both nostrils 3 times daily as needed for rhinitis 30 mL 11    metoprolol tartrate (LOPRESSOR) 25 MG tablet Take 1 tablet (25 mg) by mouth 2 times daily for 20 days 40 tablet 0    SYNTHROID 100 MCG tablet Take 1 tablet by mouth 6 days per week 90 tablet 3     No Known Allergies       Lab Results    Chemistry/lipid CBC Cardiac Enzymes/BNP/TSH/INR   Recent Labs   Lab Test 10/03/22  0800   CHOL 232*      *   TRIG 63     Recent Labs   Lab Test 10/03/22  0800 21  1748 07/10/20  1352   * 93 112     Recent  "Labs   Lab Test 11/16/23  1505      POTASSIUM 3.9   CHLORIDE 106   CO2 27   *   BUN 19.6   CR 0.87   GFRESTIMATED 74   ZACHARY 9.9     Recent Labs   Lab Test 11/16/23  1505   CR 0.87     No results for input(s): \"A1C\" in the last 09210 hours.       Recent Labs   Lab Test 11/16/23  1505   WBC 5.4   HGB 13.6   HCT 40.2   MCV 93        Recent Labs   Lab Test 11/16/23  1505 10/03/22  0800 08/04/21  1748   HGB 13.6 13.3 12.9    No results for input(s): \"TROPONINI\" in the last 43948 hours.  No results for input(s): \"BNP\", \"NTBNPI\", \"NTBNP\" in the last 82757 hours.  Recent Labs   Lab Test 11/16/23  1505   TSH 0.06*     No results for input(s): \"INR\" in the last 93107 hours.     Toña Cerda MD                                        Thank you for allowing me to participate in the care of your patient.      Sincerely,     Toña Cerda MD     [facility]  cc:   El Don MD  [facility] [address] [place] [id number]      "

## 2023-11-30 NOTE — PATIENT INSTRUCTIONS
[facility]  Cardiac Electrophysiology  [address]  [place] [address]   Office: [phone]  Fax: [phone]       Thank you for seeing us in clinic today - it is a pleasure to be a part of your care team.  Below is a summary of our plan from today's visit.      Continue metoprolol  We will see you in 6 months    Please do not hesitate to be in touch with our office at [phone] with any questions that may arise.      Thank you for trusting us with your care,    Toña Cerda MD  Clinical Cardiac Electrophysiology  [facility]  [address]  [place] [address]   Office: [phone]  Fax: [phone]

## 2023-11-30 NOTE — PROGRESS NOTES
HEART CARE ENCOUNTER CONSULTATON NOTE      [facility]  [phone]      Assessment/Recommendations   Assessment/Plan:    Gloria Myers is a very pleasant 63 year old female with PMH of hypothyroidism who presents today to the EP clinic.    SVT  - likely AT  - on metoprolol since ER visit  - symptoms well controlled  - will continue the medication for now and see in 6 months    Time spent: 45 minutes spent on the date of the encounter doing chart review, history and exam, documentation and further activities as noted above.         History of Present Illness/Subjective    HPI: Gloria Myers is a very pleasant 63 year old female with PMH of hypothyroidism who presents today to the EP clinic.    Gloria was recently seen in the ER for palpitations which appear to be short runs of SVT, likely AT. She was started on Metoprolol and discharged home. She has not had any symptoms since starting the medication.    She has had palpitations and a feeling of anxiety which started about 4 weeks ago. They occur several times a day and longest lasting episode was 30 minutes. No lightheadedness or syncope with the palpitations.    2 cups of coffee a day.  Wine 4 times a week.    She lost her son in May and has understandably been in a lot of stress and grief since then.    Labs below reviewed personally     Physical Examination  Review of Systems   Vitals: /76 (BP Location: Right arm, Patient Position: Sitting, Cuff Size: Adult Regular)   Pulse 78   Resp 16   Wt 59.9 kg (132 lb)   SpO2 98%   BMI 21.31 kg/m    BMI= Body mass index is 21.31 kg/m .  Wt Readings from Last 3 Encounters:   11/30/23 59.9 kg (132 lb)   11/16/23 63.5 kg (140 lb)   10/03/22 63 kg (138 lb 12.8 oz)       General Appearance:   no distress, normal body habitus   ENT/Mouth: membranes moist, no oral lesions or bleeding gums.      EYES:  no scleral icterus, normal conjunctivae   Neck: no carotid bruits or thyromegaly   Chest/Lungs:    lungs are clear to auscultation, no rales or wheezing,  sternal scar, equal chest wall expansion    Cardiovascular:   Regular. Normal first and second heart sounds with no murmurs, rubs, or gallops; the carotid, radial and posterior tibial pulses are intact, no edema bilaterally    Abdomen:  no organomegaly, masses, bruits, or tenderness; bowel sounds are present   Extremities: no cyanosis or clubbing   Skin: no xanthelasma, warm.    Neurologic: normal  bilateral, no tremors     Psychiatric: alert and oriented x3, calm        Please refer above for cardiac ROS details.        Medical History  Surgical History Family History Social History   Past Medical History:   Diagnosis Date    Anxiety     Cancer (H) 2019    Skin cancer    Fibroids     Hypothyroidism     Vaginal atrophy      Past Surgical History:   Procedure Laterality Date    COLONOSCOPY      TX CYSTOURETHROSCOPY,FULGUR <0.5 CM LESN      Description: Cystoscopy With Fulguration Minor Lesion (Under 5mm);  Recorded: 2012;     Family History   Problem Relation Age of Onset    Hypothyroidism Mother     Narcolepsy Mother     Asthma Mother     Heart Disease Father     Asthma Sister     Hypothyroidism Sister     Breast Cancer Maternal Grandmother         Unsure of age    Asthma Brother     No Known Problems Son     No Known Problems Son         Social History     Socioeconomic History    Marital status:      Spouse name: Not on file    Number of children: Not on file    Years of education: Not on file    Highest education level: Not on file   Occupational History    Not on file   Tobacco Use    Smoking status: Former     Packs/day: 0.25     Years: 3.00     Additional pack years: 0.00     Total pack years: 0.75     Types: Cigarettes     Quit date: 1980     Years since quittin.8    Smokeless tobacco: Never   Substance and Sexual Activity    Alcohol use: Yes     Alcohol/week: 1.0 standard drink of alcohol     Comment: Alcoholic Drinks/day: On  "ocassions    Drug use: No    Sexual activity: Not Currently     Partners: Male     Birth control/protection: Post-menopausal   Other Topics Concern    Parent/sibling w/ CABG, MI or angioplasty before 65F 55M? No   Social History Narrative    Lives .     Social Determinants of Health     Financial Resource Strain: Not on file   Food Insecurity: Not on file   Transportation Needs: Not on file   Physical Activity: Not on file   Stress: Not on file   Social Connections: Not on file   Interpersonal Safety: Not on file   Housing Stability: Not on file           Medications  Allergies   Current Outpatient Medications   Medication Sig Dispense Refill    calcium-vitamin D (CALCIUM-VITAMIN D) 500 mg(1,250mg) -200 unit per tablet [CALCIUM-VITAMIN D (CALCIUM-VITAMIN D) 500 MG(1,250MG) -200 UNIT PER TABLET] Take 1 tablet by mouth 2 (two) times a day with meals.      estradiol (ESTRACE) 0.1 MG/GM vaginal cream [ESTRADIOL (ESTRACE) 0.01 % (0.1 MG/GRAM) VAGINAL CREAM] Use 0.5 gm weekly 42.5 g 5    hydrOXYzine (ATARAX) 25 MG tablet TAKE 1 TABLET BY MOUTH EVERY 6 HOURS AS NEEDED FOR ANXIETY 30 tablet 0    ipratropium (ATROVENT) 0.06 % nasal spray Spray 2 sprays into both nostrils 3 times daily as needed for rhinitis 30 mL 11    metoprolol tartrate (LOPRESSOR) 25 MG tablet Take 1 tablet (25 mg) by mouth 2 times daily for 20 days 40 tablet 0    SYNTHROID 100 MCG tablet Take 1 tablet by mouth 6 days per week 90 tablet 3     No Known Allergies       Lab Results    Chemistry/lipid CBC Cardiac Enzymes/BNP/TSH/INR   Recent Labs   Lab Test 10/03/22  0800   CHOL 232*      *   TRIG 63     Recent Labs   Lab Test 10/03/22  0800 08/04/21  1748 07/10/20  1352   * 93 112     Recent Labs   Lab Test 11/16/23  1505      POTASSIUM 3.9   CHLORIDE 106   CO2 27   *   BUN 19.6   CR 0.87   GFRESTIMATED 74   ZACHARY 9.9     Recent Labs   Lab Test 11/16/23  1505   CR 0.87     No results for input(s): \"A1C\" in the last " "53476 hours.       Recent Labs   Lab Test 11/16/23  1505   WBC 5.4   HGB 13.6   HCT 40.2   MCV 93        Recent Labs   Lab Test 11/16/23  1505 10/03/22  0800 08/04/21  1748   HGB 13.6 13.3 12.9    No results for input(s): \"TROPONINI\" in the last 65439 hours.  No results for input(s): \"BNP\", \"NTBNPI\", \"NTBNP\" in the last 70502 hours.  Recent Labs   Lab Test 11/16/23  1505   TSH 0.06*     No results for input(s): \"INR\" in the last 24925 hours.     Toña Cerda MD                                      "

## 2023-12-23 ENCOUNTER — HEALTH MAINTENANCE LETTER (OUTPATIENT)
Age: 63
End: 2023-12-23

## 2023-12-29 ASSESSMENT — ENCOUNTER SYMPTOMS
CHILLS: 0
PALPITATIONS: 0
FEVER: 0
DYSURIA: 0
HEARTBURN: 0
MYALGIAS: 0
PARESTHESIAS: 0
BREAST MASS: 0
DIARRHEA: 0
WEAKNESS: 0
FREQUENCY: 0
ARTHRALGIAS: 0
NAUSEA: 0
COUGH: 0
SORE THROAT: 0
JOINT SWELLING: 0
CONSTIPATION: 0
HEADACHES: 0
NERVOUS/ANXIOUS: 1
HEMATURIA: 0
EYE PAIN: 0
DIZZINESS: 0
ABDOMINAL PAIN: 0
SHORTNESS OF BREATH: 0
HEMATOCHEZIA: 0

## 2023-12-29 ASSESSMENT — ACTIVITIES OF DAILY LIVING (ADL): CURRENT_FUNCTION: NO ASSISTANCE NEEDED

## 2024-01-05 ENCOUNTER — OFFICE VISIT (OUTPATIENT)
Dept: FAMILY MEDICINE | Facility: CLINIC | Age: 64
End: 2024-01-05
Payer: COMMERCIAL

## 2024-01-05 VITALS
SYSTOLIC BLOOD PRESSURE: 102 MMHG | WEIGHT: 131.4 LBS | RESPIRATION RATE: 20 BRPM | HEIGHT: 67 IN | OXYGEN SATURATION: 99 % | BODY MASS INDEX: 20.62 KG/M2 | TEMPERATURE: 97.8 F | HEART RATE: 70 BPM | DIASTOLIC BLOOD PRESSURE: 62 MMHG

## 2024-01-05 DIAGNOSIS — E78.5 HYPERLIPIDEMIA LDL GOAL <100: ICD-10-CM

## 2024-01-05 DIAGNOSIS — E03.9 HYPOTHYROIDISM, UNSPECIFIED TYPE: ICD-10-CM

## 2024-01-05 DIAGNOSIS — Z13.1 SCREENING FOR DIABETES MELLITUS: ICD-10-CM

## 2024-01-05 DIAGNOSIS — Z00.00 WELCOME TO MEDICARE PREVENTIVE VISIT: Primary | ICD-10-CM

## 2024-01-05 DIAGNOSIS — Z86.79 HISTORY OF SUPRAVENTRICULAR TACHYCARDIA: ICD-10-CM

## 2024-01-05 DIAGNOSIS — Z78.0 ASYMPTOMATIC POSTMENOPAUSAL STATUS: ICD-10-CM

## 2024-01-05 DIAGNOSIS — F41.9 ANXIETY: ICD-10-CM

## 2024-01-05 DIAGNOSIS — F43.21 GRIEF REACTION: ICD-10-CM

## 2024-01-05 DIAGNOSIS — N95.2 VAGINAL ATROPHY: ICD-10-CM

## 2024-01-05 DIAGNOSIS — Z12.31 ENCOUNTER FOR SCREENING MAMMOGRAM FOR BREAST CANCER: ICD-10-CM

## 2024-01-05 DIAGNOSIS — Z85.828 HISTORY OF SQUAMOUS CELL CARCINOMA OF SKIN: ICD-10-CM

## 2024-01-05 LAB
ANION GAP SERPL CALCULATED.3IONS-SCNC: 10 MMOL/L (ref 7–15)
ATRIAL RATE - MUSE: 62 BPM
BUN SERPL-MCNC: 19 MG/DL (ref 8–23)
CALCIUM SERPL-MCNC: 9.4 MG/DL (ref 8.8–10.2)
CHLORIDE SERPL-SCNC: 107 MMOL/L (ref 98–107)
CHOLEST SERPL-MCNC: 213 MG/DL
CREAT SERPL-MCNC: 0.82 MG/DL (ref 0.51–0.95)
DEPRECATED HCO3 PLAS-SCNC: 26 MMOL/L (ref 22–29)
DIASTOLIC BLOOD PRESSURE - MUSE: NORMAL MMHG
EGFRCR SERPLBLD CKD-EPI 2021: 79 ML/MIN/1.73M2
FASTING STATUS PATIENT QL REPORTED: YES
GLUCOSE SERPL-MCNC: 90 MG/DL (ref 70–99)
HDLC SERPL-MCNC: 86 MG/DL
INTERPRETATION ECG - MUSE: NORMAL
LDLC SERPL CALC-MCNC: 116 MG/DL
NONHDLC SERPL-MCNC: 127 MG/DL
P AXIS - MUSE: 79 DEGREES
POTASSIUM SERPL-SCNC: 4.5 MMOL/L (ref 3.4–5.3)
PR INTERVAL - MUSE: 164 MS
QRS DURATION - MUSE: 86 MS
QT - MUSE: 434 MS
QTC - MUSE: 440 MS
R AXIS - MUSE: 70 DEGREES
SODIUM SERPL-SCNC: 143 MMOL/L (ref 135–145)
SYSTOLIC BLOOD PRESSURE - MUSE: NORMAL MMHG
T AXIS - MUSE: 68 DEGREES
T4 FREE SERPL-MCNC: 1.44 NG/DL (ref 0.9–1.7)
TRIGL SERPL-MCNC: 53 MG/DL
TSH SERPL DL<=0.005 MIU/L-ACNC: 0.18 UIU/ML (ref 0.3–4.2)
VENTRICULAR RATE- MUSE: 62 BPM

## 2024-01-05 PROCEDURE — 80061 LIPID PANEL: CPT

## 2024-01-05 PROCEDURE — G0402 INITIAL PREVENTIVE EXAM: HCPCS

## 2024-01-05 PROCEDURE — 99213 OFFICE O/P EST LOW 20 MIN: CPT | Mod: 25

## 2024-01-05 PROCEDURE — 80048 BASIC METABOLIC PNL TOTAL CA: CPT

## 2024-01-05 PROCEDURE — G0405 EKG INTERPRET & REPORT PREVE: HCPCS | Performed by: INTERNAL MEDICINE

## 2024-01-05 PROCEDURE — G0404 EKG TRACING FOR INITIAL PREV: HCPCS

## 2024-01-05 PROCEDURE — 84443 ASSAY THYROID STIM HORMONE: CPT

## 2024-01-05 PROCEDURE — 36415 COLL VENOUS BLD VENIPUNCTURE: CPT

## 2024-01-05 PROCEDURE — 84439 ASSAY OF FREE THYROXINE: CPT

## 2024-01-05 RX ORDER — ESTRADIOL 0.1 MG/G
CREAM VAGINAL
Qty: 42.5 G | Refills: 5 | Status: SHIPPED | OUTPATIENT
Start: 2024-01-05

## 2024-01-05 ASSESSMENT — ENCOUNTER SYMPTOMS
CONSTIPATION: 0
PARESTHESIAS: 0
HEMATOCHEZIA: 0
ABDOMINAL PAIN: 0
ARTHRALGIAS: 0
PALPITATIONS: 0
SORE THROAT: 0
NAUSEA: 0
MYALGIAS: 0
EYE PAIN: 0
DIZZINESS: 0
HEADACHES: 0
COUGH: 0
SHORTNESS OF BREATH: 0
FEVER: 0
HEARTBURN: 0
HEMATURIA: 0
FREQUENCY: 0
CHILLS: 0
NERVOUS/ANXIOUS: 1
DIARRHEA: 0
JOINT SWELLING: 0
BREAST MASS: 0
DYSURIA: 0
WEAKNESS: 0

## 2024-01-05 ASSESSMENT — ACTIVITIES OF DAILY LIVING (ADL): CURRENT_FUNCTION: NO ASSISTANCE NEEDED

## 2024-01-05 NOTE — PROGRESS NOTES
"SUBJECTIVE:   Dot is a 63 year old, presenting for the following:  Medicare Visit (WTM. Follow up on ongoing concerns, heart medication, toe feels like its \"dead\" just one toe )        1/5/2024     7:15 AM   Additional Questions   Roomed by Kirti AGRAWAL     Synthroid 100 mcg everyday. Decreased by one dose per week in November. No symptoms. Did have those heart palpitations in November, but have resolved on the metoprolol. She saw cardiology. No increased light-headedness or dizziness. No syncope.     Estradiol vaginal cream. No symptoms of pain or bleeding.  Went through menopause. Things are going okay. Still having hot flashes.     Hydroxyzine for anxiety. Only taking this when needed. This helps. Per week, probably once. Takes it for a few days in a row after the first dose. Would like to continue this for anxiety management.     Chronically has draining sinuses. She has a nasal spray if needed. Saw ENT. Also has some vertigo and decreased hearing concerns, but has had hearing testing and it has been normal. Does not think any further work-up is needed.     Skin cancer: SCC. Follows with dermatology. [place].     Colonoscopy: February 2024 scheduled with [facility]. She had one 5 years ago, had polyps.      Had a son pass away from suicide this year. She feels she is coping as appropriate at this time. Her other son, his twin, moved back home with her grand kids so she stays busy with family.     Are you in the first 12 months of your Medicare coverage?  Yes,  Visual Acuity:  Right Eye: 20/50   Left Eye: 20/63  Both Eyes: 20/32    Healthy Habits:     In general, how would you rate your overall health?  Good    Frequency of exercise:  2-3 days/week    Duration of exercise:  15-30 minutes    Do you usually eat at least 4 servings of fruit and vegetables a day, include whole grains    & fiber and avoid regularly eating high fat or \"junk\" foods?  Yes    Taking medications regularly:  No    Barriers to taking medications:  " Not applicable    Medication side effects:  None    Ability to successfully perform activities of daily living:  No assistance needed    Home Safety:  No safety concerns identified    Hearing Impairment:  Difficulty following a conversation in a noisy restaurant or crowded room    In the past 6 months, have you been bothered by leaking of urine? Yes    In general, how would you rate your overall mental or emotional health?  Good    Additional concerns today:  Yes      Today's PHQ-2 Score:       1/5/2024     7:09 AM   PHQ-2 ( 1999 Pfizer)   Q1: Little interest or pleasure in doing things 1   Q2: Feeling down, depressed or hopeless 1   PHQ-2 Score 2   Q1: Little interest or pleasure in doing things Several days   Q2: Feeling down, depressed or hopeless Several days   PHQ-2 Score 2       Have you ever done Advance Care Planning? (For example, a Health Directive, POLST, or a discussion with a medical provider or your loved ones about your wishes): Yes, patient states has an Advance Care Planning document and will bring a copy to the clinic.     Fall risk  Fallen 2 or more times in the past year?: No  Any fall with injury in the past year?: No  click delete button to remove this line now  Cognitive Screening   1) Repeat 3 items (Leader, Season, Table)    2) Clock draw: NORMAL  3) 3 item recall: Recalls 3 objects  Results: 3 items recalled: COGNITIVE IMPAIRMENT LESS LIKELY    Mini-CogTM Copyright WEI Melgoza. Licensed by the author for use in [facility]; reprinted with permission (shashi@.[facility]). All rights reserved.      Do you have sleep apnea, excessive snoring or daytime drowsiness? : no    Reviewed and updated as needed this visit by clinical staff   Tobacco  Allergies  Meds              Reviewed and updated as needed this visit by Provider     Meds   Med Hx   Fam Hx         Social History     Tobacco Use    Smoking status: Former     Packs/day: 0.25     Years: 3.00     Additional pack years: 0.00      Total pack years: 0.75     Types: Cigarettes     Quit date: 1980     Years since quittin.9    Smokeless tobacco: Never   Substance Use Topics    Alcohol use: Yes     Alcohol/week: 1.0 standard drink of alcohol     Comment: Alcoholic Drinks/day: On ocassions             2023    11:25 AM   Alcohol Use   Prescreen: >3 drinks/day or >7 drinks/week? No     Do you have a current opioid prescription? No  Do you use any other controlled substances or medications that are not prescribed by a provider? None      Current providers sharing in care for this patient include:   Patient Care Team:  Alisha Hale MD as PCP - General (Family Practice)  Alisha Hale MD as Assigned PCP  Larry Montoya MD as MD (Endocrinology, Diabetes, and Metabolism)  Coy Balderas MD as MD (Otolaryngology)  Edna Sullivan AuD as Audiologist (Audiology)  Larry Montoya MD as Assigned Endocrinology Provider  Coy Balderas MD as Assigned Surgical Provider  Toña Cerda MD as Assigned Heart and Vascular Provider    The following health maintenance items are reviewed in Epic and correct as of today:  Health Maintenance   Topic Date Due    ANNUAL REVIEW OF HM ORDERS  10/03/2023    MEDICARE ANNUAL WELLNESS VISIT  10/25/2023    COLORECTAL CANCER SCREENING  2023    MAMMO SCREENING  2024    TSH W/FREE T4 REFLEX  2024    FALL RISK ASSESSMENT  2025    LIPID  10/03/2027    ADVANCE CARE PLANNING  10/03/2027    DTAP/TDAP/TD IMMUNIZATION (3 - Td or Tdap) 07/10/2030    DEXA  2033    HEPATITIS C SCREENING  Completed    HIV SCREENING  Completed    PHQ-2 (once per calendar year)  Completed    INFLUENZA VACCINE  Completed    Pneumococcal Vaccine: 65+ Years  Completed    ZOSTER IMMUNIZATION  Completed    RSV VACCINE (Pregnancy & 60+)  Completed    COVID-19 Vaccine  Completed    IPV IMMUNIZATION  Aged Out    HPV IMMUNIZATION  Aged Out    MENINGITIS IMMUNIZATION  Aged Out    RSV MONOCLONAL ANTIBODY   Aged Out    PAP  Discontinued     FHS-7:       5/24/2022     9:39 AM 9/26/2022     9:13 AM 5/31/2023     1:45 PM 5/31/2023     1:48 PM 12/29/2023    11:27 AM   Breast CA Risk Assessment (FHS-7)   Did any of your first-degree relatives have breast or ovarian cancer? No Yes Yes Yes Yes   Did any of your relatives have bilateral breast cancer? No Unknown No No Unknown   Did any man in your family have breast cancer? No No No No No   Did any woman in your family have breast and ovarian cancer? No No No Yes Yes   Did any woman in your family have breast cancer before age 50 y? No No No No Unknown   Do you have 2 or more relatives with breast and/or ovarian cancer? Yes Yes Yes No Yes   Do you have 2 or more relatives with breast and/or bowel cancer? No Yes Yes No Yes     Mammogram Screening: Recommended mammography every 1-2 years with patient discussion and risk factor consideration  Pertinent mammograms are reviewed under the imaging tab.    Review of Systems   Constitutional:  Negative for chills and fever.   HENT:  Positive for ear pain. Negative for congestion, hearing loss and sore throat.    Eyes:  Negative for pain and visual disturbance.   Respiratory:  Negative for cough and shortness of breath.    Cardiovascular:  Negative for chest pain, palpitations and peripheral edema.   Gastrointestinal:  Negative for abdominal pain, constipation, diarrhea, heartburn, hematochezia and nausea.   Breasts:  Positive for tenderness. Negative for breast mass and discharge.   Genitourinary:  Negative for dysuria, frequency, genital sores, hematuria, pelvic pain, urgency, vaginal bleeding and vaginal discharge.   Musculoskeletal:  Negative for arthralgias, joint swelling and myalgias.   Skin:  Negative for rash.   Neurological:  Negative for dizziness, weakness, headaches and paresthesias.   Psychiatric/Behavioral:  Negative for mood changes. The patient is nervous/anxious.      OBJECTIVE:   /62 (BP Location: Right arm,  "Patient Position: Sitting, Cuff Size: Adult Regular)   Pulse 70   Temp 97.8  F (36.6  C) (Oral)   Resp 20   Ht 1.708 m (5' 7.25\")   Wt 59.6 kg (131 lb 6.4 oz)   LMP  (LMP Unknown)   SpO2 99%   BMI 20.43 kg/m   Estimated body mass index is 20.43 kg/m  as calculated from the following:    Height as of this encounter: 1.708 m (5' 7.25\").    Weight as of this encounter: 59.6 kg (131 lb 6.4 oz).  Physical Exam  Vitals reviewed.   Constitutional:       General: She is not in acute distress.  HENT:      Right Ear: Tympanic membrane, ear canal and external ear normal.      Left Ear: Tympanic membrane, ear canal and external ear normal.   Eyes:      Extraocular Movements: Extraocular movements intact.      Pupils: Pupils are equal, round, and reactive to light.   Cardiovascular:      Rate and Rhythm: Normal rate and regular rhythm.      Pulses: Normal pulses.      Heart sounds: Normal heart sounds. No murmur heard.  Pulmonary:      Effort: Pulmonary effort is normal. No respiratory distress.      Breath sounds: No wheezing.   Abdominal:      General: Abdomen is flat. Bowel sounds are normal. There is no distension.   Musculoskeletal:         General: Normal range of motion.      Cervical back: Normal range of motion. No rigidity.      Right lower leg: No edema.      Left lower leg: No edema.   Lymphadenopathy:      Cervical: No cervical adenopathy.   Neurological:      General: No focal deficit present.      Mental Status: She is alert.      Cranial Nerves: No cranial nerve deficit.      Sensory: No sensory deficit.      Motor: No weakness.      Gait: Gait normal.   Psychiatric:         Mood and Affect: Mood normal.         Thought Content: Thought content normal.       ASSESSMENT / PLAN:   Welcome to Medicare preventive visit  On exam, healthy 63 year old patient. No acute or concerning findings on today's physical exam. Vital signs at goal. EKG and vision screening completed for first Medicare visit. No falls. Mood " discussed below. No concerns from patient with memory, no concerns from visit today. Scheduled for colonoscopy in Feb 2024.    - REVIEW OF HEALTH MAINTENANCE PROTOCOL ORDERS  - EKG 12-lead, tracing only    Asymptomatic postmenopausal status  Last DEXA was in 2018. Showing normal bone density. Patient is taking calcium and vitamin D at this time. Repeat recommended in 5 years. Ordered today.   - DX Hip/Pelvis/Spine; Future    Hypothyroidism, unspecified type  Chronic. Last dose adjustment was to decrease to 6 days a week of 100 mcg of Synthroid. She has been tolerating well. No side effects or symptoms of hyper or hypothyroidism. Recheck TSH today.   - TSH with free T4 reflex    Screening for diabetes mellitus  Elevated glucose in the ER, likely related to non-fasting. Recheck today with BMP for screening.   - Basic metabolic panel  (Ca, Cl, CO2, Creat, Gluc, K, Na, BUN)    Hyperlipidemia LDL goal <100  Last checked 10/3/2022, elevated LDL at 119 ad total cholesterol at 232. Not on statin therapy. Recheck today.   - Lipid Profile (Chol, Trig, HDL, LDL calc)    Recent Labs   Lab Test 01/05/24  0812 10/03/22  0800   CHOL 213* 232*   HDL 86 100   * 119*   TRIG 53 63      The 10-year ASCVD risk score (Juli WATTS, et al., 2019) is: 3.1%    Values used to calculate the score:      Age: 63 years      Sex: Female      Is Non- : No      Diabetic: No      Tobacco smoker: No      Systolic Blood Pressure: 102 mmHg      Is BP treated: No      HDL Cholesterol: 86 mg/dL      Total Cholesterol: 213 mg/dL     Vaginal atrophy  Following menopause. Still having hot flashes, not bothersome. Estrace cream helps with the vaginal pain from atrophy they patient was having. Using weekly. Would like to continue. Refilled today.   - estradiol (ESTRACE) 0.1 MG/GM vaginal cream; [ESTRADIOL (ESTRACE) 0.01 % (0.1 MG/GRAM) VAGINAL CREAM] Use 0.5 gm weekly    Encounter for screening mammogram for breast  cancer  Discussed today, patient would like to continue with annual mammograms due to dense breasts and family history. Ordered mammogram, due in 05/2024.   - *MA Screening Digital Bilateral; Future     Anxiety  Grief reaction  Patient using hydroxyzine once weekly for increased anxiety, feels this is managing well. We will continue this for anxiety management. Loss of her son in 2023, increased grief and depressive symptoms. Patient feels she is doing well. During out visit today, I agree. We discussed prolonged grief and that while there is no appropriate timeline for grief, if she feels she is having a hard time overcoming the loss of her son, or depressive symptoms are worsening, I would want her to check in. She agrees. Continue to spend time with family and friends.     PHQ-2 Score:         1/5/2024     7:09 AM 9/26/2022     9:12 AM   PHQ-2 ( 1999 Pfizer)   Q1: Little interest or pleasure in doing things 1 0   Q2: Feeling down, depressed or hopeless 1 0   PHQ-2 Score 2 0   Q1: Little interest or pleasure in doing things Several days Not at all    Not at all   Q2: Feeling down, depressed or hopeless Several days Not at all    Not at all   PHQ-2 Score 2 0    0      History of SCC  Follows with dermatology in [place] at least annually for skin checks. Continue. No lesions or moles of concern today.     History of SVT  In 11/2023. Narrow complex tachycardia with heart palpitations. Managed well with metoprolol. EKG today is NSR. No symptoms. Continue metoprolol. Follow-up with cardiology in 05/2024.    COUNSELING:  Reviewed preventive health counseling, as reflected in patient instructions       Regular exercise       Healthy diet/nutrition       Vision screening       Hearing screening       Fall risk prevention       Colon cancer screening      She reports that she quit smoking about 43 years ago. Her smoking use included cigarettes. She has a 0.8 pack-year smoking history. She has never used smokeless  tobacco.      Appropriate preventive services were discussed with this patient, including applicable screening as appropriate for fall prevention, nutrition, physical activity, Tobacco-use cessation, weight loss and cognition.  Checklist reviewing preventive services available has been given to the patient.    Reviewed patients plan of care and provided an AVS. The Basic Care Plan (routine screening as documented in Health Maintenance) for Gloria meets the Care Plan requirement. This Care Plan has been established and reviewed with the Patient.    Identified Health Risks:  I have reviewed Opioid Use Disorder and Substance Use Disorder risk factors and made any needed referrals.     At the end of the visit, I confirmed understanding of what was discussed. Patient has not further questions or concerns that were brought up at this time.     The patient was provided with written information regarding signs of hearing loss.  Information on urinary incontinence and treatment options given to patient.    At the end of the visit, I confirmed understanding of what was discussed. Gloria has no further questions or concerns that were brought up at this time.     20 minutes spent in addition to the preventative visit by me on the date of the encounter doing chart review, history and exam, documentation and further activities per the note      Jesus Johnson DNP, APRN, FNP-C

## 2024-01-05 NOTE — PATIENT INSTRUCTIONS
Personalized Prevention Plan  You are due for the preventive services outlined below.  Your care team is available to assist you in scheduling these services.  If you have already completed any of these items, please share that information with your care team to update in your medical record.  Health Maintenance Due   Topic Date Due     Colorectal Cancer Screening  12/21/2023     Hearing Loss: Care Instructions  Overview     Hearing loss is a sudden or slow decrease in how well you hear. It can range from slight to profound. Permanent hearing loss can occur with aging. It also can happen when you are exposed long-term to loud noise. Examples include listening to loud music, riding motorcycles, or being around other loud machines.  Hearing loss can affect your work and home life. It can make you feel lonely or depressed. You may feel that you have lost your independence. But hearing aids and other devices can help you hear better and feel connected to others.  Follow-up care is a key part of your treatment and safety. Be sure to make and go to all appointments, and call your doctor if you are having problems. It's also a good idea to know your test results and keep a list of the medicines you take.  How can you care for yourself at home?  Avoid loud noises whenever possible. This helps keep your hearing from getting worse.  Always wear hearing protection around loud noises.  Wear a hearing aid as directed.  A professional can help you pick a hearing aid that will work best for you.  You can also get hearing aids over the counter for mild to moderate hearing loss.  Have hearing tests as your doctor suggests. They can show whether your hearing has changed. Your hearing aid may need to be adjusted.  Use other devices as needed. These may include:  Telephone amplifiers and hearing aids that can connect to a television, stereo, radio, or microphone.  Devices that use lights or vibrations. These alert you to the doorbell,  "a ringing telephone, or a baby monitor.  Television closed-captioning. This shows the words at the bottom of the screen. Most new TVs can do this.  TTY (text telephone). This lets you type messages back and forth on the telephone instead of talking or listening. These devices are also called TDD. When messages are typed on the keyboard, they are sent over the phone line to a receiving TTY. The message is shown on a monitor.  Use text messaging, social media, and email if it is hard for you to communicate by telephone.  Try to learn a listening technique called speechreading. It is not lipreading. You pay attention to people's gestures, expressions, posture, and tone of voice. These clues can help you understand what a person is saying. Face the person you are talking to, and have them face you. Make sure the lighting is good. You need to see the other person's face clearly.  Think about counseling if you need help to adjust to your hearing loss.  When should you call for help?  Watch closely for changes in your health, and be sure to contact your doctor if:    You think your hearing is getting worse.     You have new symptoms, such as dizziness or nausea.   Where can you learn more?  Go to https://www.[employer].net/patiented  Enter R798 in the search box to learn more about \"Hearing Loss: Care Instructions.\"  Current as of: February 28, 2023               Content Version: 13.8    0416-4612 [employer].   Care instructions adapted under license by your healthcare professional. If you have questions about a medical condition or this instruction, always ask your healthcare professional. [employer] disclaims any warranty or liability for your use of this information.      Bladder Training: Care Instructions  Your Care Instructions     Bladder training is used to treat urge incontinence and stress incontinence. Urge incontinence means that the need to urinate comes on so fast that you can't " get to a toilet in time. Stress incontinence means that you leak urine because of pressure on your bladder. For example, it may happen when you laugh, cough, or lift something heavy.  Bladder training can increase how long you can wait before you have to urinate. It can also help your bladder hold more urine. And it can give you better control over the urge to urinate.  It is important to remember that bladder training takes a few weeks to a few months to make a difference. You may not see results right away, but don't give up.  Follow-up care is a key part of your treatment and safety. Be sure to make and go to all appointments, and call your doctor if you are having problems. It's also a good idea to know your test results and keep a list of the medicines you take.  How can you care for yourself at home?  Work with your doctor to come up with a bladder training program that is right for you. You may use one or more of the following methods.  Delayed urination  In the beginning, try to keep from urinating for 5 minutes after you first feel the need to go.  While you wait, take deep, slow breaths to relax. Kegel exercises can also help you delay the need to go to the bathroom.  After some practice, when you can easily wait 5 minutes to urinate, try to wait 10 minutes before you urinate.  Slowly increase the waiting period until you are able to control when you have to urinate.  Scheduled urination  Empty your bladder when you first wake up in the morning.  Schedule times throughout the day when you will urinate.  Start by going to the bathroom every hour, even if you don't need to go.  Slowly increase the time between trips to the bathroom.  When you have found a schedule that works well for you, keep doing it.  If you wake up during the night and have to urinate, do it. Apply your schedule to waking hours only.  Kegel exercises  These tighten and strengthen pelvic muscles, which can help you control the flow of  "urine. (If doing these exercises causes pain, stop doing them and talk with your doctor.) To do Kegel exercises:  Squeeze your muscles as if you were trying not to pass gas. Or squeeze your muscles as if you were stopping the flow of urine. Your belly, legs, and buttocks shouldn't move.  Hold the squeeze for 3 seconds, then relax for 5 to 10 seconds.  Start with 3 seconds, then add 1 second each week until you are able to squeeze for 10 seconds.  Repeat the exercise 10 times a session. Do 3 to 8 sessions a day.  When should you call for help?  Watch closely for changes in your health, and be sure to contact your doctor if:    Your incontinence is getting worse.     You do not get better as expected.   Where can you learn more?  Go to https://[email or web address].net/patiented  Enter V684 in the search box to learn more about \"Bladder Training: Care Instructions.\"  Current as of: February 28, 2023               Content Version: 13.8    3750-0476 [employer].   Care instructions adapted under license by your healthcare professional. If you have questions about a medical condition or this instruction, always ask your healthcare professional. [employer] disclaims any warranty or liability for your use of this information.      "

## 2024-01-08 DIAGNOSIS — E03.9 ACQUIRED HYPOTHYROIDISM: Primary | ICD-10-CM

## 2024-01-16 RX ORDER — HYDROXYZINE HYDROCHLORIDE 25 MG/1
25 TABLET, FILM COATED ORAL EVERY 6 HOURS
Qty: 30 TABLET | Refills: 0 | Status: SHIPPED | OUTPATIENT
Start: 2024-01-16 | End: 2024-01-24

## 2024-01-19 ENCOUNTER — TELEPHONE (OUTPATIENT)
Dept: FAMILY MEDICINE | Facility: CLINIC | Age: 64
End: 2024-01-19
Payer: COMMERCIAL

## 2024-01-24 ENCOUNTER — TELEPHONE (OUTPATIENT)
Dept: FAMILY MEDICINE | Facility: CLINIC | Age: 64
End: 2024-01-24

## 2024-01-24 ENCOUNTER — ANCILLARY PROCEDURE (OUTPATIENT)
Dept: BONE DENSITY | Facility: CLINIC | Age: 64
End: 2024-01-24
Payer: COMMERCIAL

## 2024-01-24 DIAGNOSIS — Z78.0 ASYMPTOMATIC POSTMENOPAUSAL STATUS: ICD-10-CM

## 2024-01-24 DIAGNOSIS — F41.9 ANXIETY: ICD-10-CM

## 2024-01-24 PROCEDURE — 77080 DXA BONE DENSITY AXIAL: CPT | Mod: TC | Performed by: PHYSICIAN ASSISTANT

## 2024-01-24 NOTE — TELEPHONE ENCOUNTER
Patient calling to state medicatrion is not allowed through updated insurance    insurance looking for something similar that can insurance can be used     hydrOXYzine HCl (ATARAX) 25 MG tablet    Contact Information  [phone] (Mobile)

## 2024-01-30 ENCOUNTER — TELEPHONE (OUTPATIENT)
Dept: FAMILY MEDICINE | Facility: CLINIC | Age: 64
End: 2024-01-30
Payer: COMMERCIAL

## 2024-01-30 NOTE — TELEPHONE ENCOUNTER
----- Message from ZACK Toussaint CNP sent at 1/30/2024 10:50 AM CST -----  Please call patient with message below for DEXA scan.      Dot,     Your bone scan shows great bone health. We will continue to monitor this every few years. You should be getting 1200 mg of calcium (total of diet and supplement) and 800 international units of vitamin D. Please monitor how much calcium you are getting and supplement as needed to get to 1200 mg daily. Vitamin D is available over the counter. This will help try and prevent bone loss in the future.     Please do not hesitate to reach out with any questions or concerns,     Jesus Johnson DNP, ZACK, FNP-C

## 2024-01-30 NOTE — TELEPHONE ENCOUNTER
Patient calling back from a missed call     Writer relayed the message     Patient good to go

## 2024-01-31 ENCOUNTER — TELEPHONE (OUTPATIENT)
Dept: FAMILY MEDICINE | Facility: CLINIC | Age: 64
End: 2024-01-31
Payer: COMMERCIAL

## 2024-01-31 RX ORDER — HYDROXYZINE HYDROCHLORIDE 25 MG/1
25 TABLET, FILM COATED ORAL EVERY 6 HOURS PRN
Qty: 30 TABLET | Refills: 0 | Status: SHIPPED | OUTPATIENT
Start: 2024-01-31

## 2024-01-31 NOTE — TELEPHONE ENCOUNTER
Retail Pharmacy Prior Authorization Team   Phone: [phone]      EPA DENIED: LETTER ATTACHED

## 2024-01-31 NOTE — TELEPHONE ENCOUNTER
Retail Pharmacy Prior Authorization Team   Phone: [phone]    PA Initiation    Medication: HYDROXYZINE HCL 25 MG PO TABS  Insurance Company: [employer] Non-Specialty PA's - Phone [phone] Fax [phone]  Pharmacy Filling the Rx: [id number] - [address]  Filling Pharmacy Phone: [phone]  Filling Pharmacy Fax:    Start Date: 1/31/2024

## 2024-02-01 NOTE — TELEPHONE ENCOUNTER
PRIOR AUTHORIZATION DENIED    Medication: HYDROXYZINE HCL 25 MG PO TABS  Insurance Company: [employer] - Phone [phone] Fax [phone]  Denial Date: 1/31/2024  Denial Reason(s):           Appeal Information:         Patient Notified: No

## 2024-02-01 NOTE — TELEPHONE ENCOUNTER
Please let patient know I am going to appeal the medication, we will send the letter to the appeal team today.

## 2024-02-02 NOTE — TELEPHONE ENCOUNTER
PRIOR AUTHORIZATION DENIED    Medication: HYDROXYZINE HCL 25 MG PO TABS  Insurance Company: [employer] - Phone [phone] Fax [phone]  Denial Date: 1/31/2024  Denial Reason(s):     Appeal Information:       Patient Notified: No

## 2024-02-06 ASSESSMENT — ANXIETY QUESTIONNAIRES
IF YOU CHECKED OFF ANY PROBLEMS ON THIS QUESTIONNAIRE, HOW DIFFICULT HAVE THESE PROBLEMS MADE IT FOR YOU TO DO YOUR WORK, TAKE CARE OF THINGS AT HOME, OR GET ALONG WITH OTHER PEOPLE: NOT DIFFICULT AT ALL
4. TROUBLE RELAXING: SEVERAL DAYS
8. IF YOU CHECKED OFF ANY PROBLEMS, HOW DIFFICULT HAVE THESE MADE IT FOR YOU TO DO YOUR WORK, TAKE CARE OF THINGS AT HOME, OR GET ALONG WITH OTHER PEOPLE?: NOT DIFFICULT AT ALL
7. FEELING AFRAID AS IF SOMETHING AWFUL MIGHT HAPPEN: NOT AT ALL
GAD7 TOTAL SCORE: 2
7. FEELING AFRAID AS IF SOMETHING AWFUL MIGHT HAPPEN: NOT AT ALL
5. BEING SO RESTLESS THAT IT IS HARD TO SIT STILL: SEVERAL DAYS
3. WORRYING TOO MUCH ABOUT DIFFERENT THINGS: NOT AT ALL
1. FEELING NERVOUS, ANXIOUS, OR ON EDGE: NOT AT ALL
2. NOT BEING ABLE TO STOP OR CONTROL WORRYING: NOT AT ALL
GAD7 TOTAL SCORE: 2
6. BECOMING EASILY ANNOYED OR IRRITABLE: NOT AT ALL

## 2024-02-06 NOTE — TELEPHONE ENCOUNTER
Please get patient scheduled for a phone visit in regard to this. Okay to use lunch hour or override.

## 2024-02-07 ENCOUNTER — VIRTUAL VISIT (OUTPATIENT)
Dept: FAMILY MEDICINE | Facility: CLINIC | Age: 64
End: 2024-02-07
Payer: COMMERCIAL

## 2024-02-07 DIAGNOSIS — F41.9 ANXIETY: Primary | ICD-10-CM

## 2024-02-07 PROCEDURE — 99207 PR NO CHARGE LOS: CPT | Mod: 93

## 2024-02-07 NOTE — PROGRESS NOTES
Called to discuss medication being denied by insurance with patient.  She reports she does have enough to get by for a while.  She takes the hydroxyzine less than once every few months, takes it for few days when she has a flare of anxiety.  She has not tried anything in the past.  She will continue on this regimen with the medication she has at home.  When she runs out if there is issues with getting it filled again she will reach out.  We did discuss how she could trial an as needed medication like a benzodiazepine, but would want to meet over the phone to discuss further.  She does not have daily anxiety.

## 2024-03-07 DIAGNOSIS — E03.9 ACQUIRED HYPOTHYROIDISM: ICD-10-CM

## 2024-03-08 RX ORDER — LEVOTHYROXINE SODIUM 100 MCG
TABLET ORAL
Qty: 30 TABLET | Refills: 0 | Status: SHIPPED | OUTPATIENT
Start: 2024-03-08 | End: 2024-04-01

## 2024-03-08 NOTE — TELEPHONE ENCOUNTER
Sent message to pharmacy, but patient with one month fill as she needs to complete recommended blood work.

## 2024-03-11 DIAGNOSIS — E03.9 ACQUIRED HYPOTHYROIDISM: ICD-10-CM

## 2024-03-11 RX ORDER — LEVOTHYROXINE SODIUM 100 MCG
TABLET ORAL
Qty: 90 TABLET | Refills: 3 | OUTPATIENT
Start: 2024-03-11

## 2024-03-15 ENCOUNTER — MYC REFILL (OUTPATIENT)
Dept: FAMILY MEDICINE | Facility: CLINIC | Age: 64
End: 2024-03-15
Payer: COMMERCIAL

## 2024-03-15 DIAGNOSIS — E03.9 ACQUIRED HYPOTHYROIDISM: ICD-10-CM

## 2024-03-15 RX ORDER — LEVOTHYROXINE SODIUM 100 MCG
TABLET ORAL
Qty: 30 TABLET | Refills: 0 | OUTPATIENT
Start: 2024-03-15

## 2024-03-29 ENCOUNTER — LAB (OUTPATIENT)
Dept: LAB | Facility: CLINIC | Age: 64
End: 2024-03-29
Payer: COMMERCIAL

## 2024-03-29 DIAGNOSIS — E03.9 ACQUIRED HYPOTHYROIDISM: ICD-10-CM

## 2024-03-29 LAB — TSH SERPL DL<=0.005 MIU/L-ACNC: 3.16 UIU/ML (ref 0.3–4.2)

## 2024-03-29 PROCEDURE — 36415 COLL VENOUS BLD VENIPUNCTURE: CPT

## 2024-03-29 PROCEDURE — 84443 ASSAY THYROID STIM HORMONE: CPT

## 2024-03-31 ENCOUNTER — MYC MEDICAL ADVICE (OUTPATIENT)
Dept: FAMILY MEDICINE | Facility: CLINIC | Age: 64
End: 2024-03-31
Payer: COMMERCIAL

## 2024-03-31 DIAGNOSIS — E03.9 ACQUIRED HYPOTHYROIDISM: ICD-10-CM

## 2024-03-31 DIAGNOSIS — E03.9 HYPOTHYROIDISM, UNSPECIFIED TYPE: Primary | ICD-10-CM

## 2024-04-01 DIAGNOSIS — E03.9 ACQUIRED HYPOTHYROIDISM: ICD-10-CM

## 2024-04-01 RX ORDER — LEVOTHYROXINE SODIUM 100 MCG
100 TABLET ORAL
Qty: 90 TABLET | Refills: 1 | Status: SHIPPED | OUTPATIENT
Start: 2024-04-01 | End: 2024-05-29

## 2024-04-04 RX ORDER — LEVOTHYROXINE SODIUM 100 UG/1
100 TABLET ORAL
Qty: 90 TABLET | Refills: 1 | Status: SHIPPED | OUTPATIENT
Start: 2024-04-04 | End: 2024-09-09

## 2024-05-02 ENCOUNTER — E-VISIT (OUTPATIENT)
Dept: PEDIATRICS | Facility: CLINIC | Age: 64
End: 2024-05-02
Payer: COMMERCIAL

## 2024-05-02 DIAGNOSIS — R05.8 POST-VIRAL COUGH SYNDROME: Primary | ICD-10-CM

## 2024-05-02 PROCEDURE — 99421 OL DIG E/M SVC 5-10 MIN: CPT

## 2024-05-02 RX ORDER — BENZONATATE 100 MG/1
100 CAPSULE ORAL 3 TIMES DAILY PRN
Qty: 30 CAPSULE | Refills: 0 | Status: SHIPPED | OUTPATIENT
Start: 2024-05-02 | End: 2024-05-29

## 2024-05-03 ENCOUNTER — E-VISIT (OUTPATIENT)
Dept: PEDIATRICS | Facility: CLINIC | Age: 64
End: 2024-05-03
Payer: COMMERCIAL

## 2024-05-03 DIAGNOSIS — B34.9 VIRAL ILLNESS: Primary | ICD-10-CM

## 2024-05-03 PROCEDURE — 99207 PR NON-BILLABLE SERV PER CHARTING: CPT

## 2024-05-28 NOTE — PROGRESS NOTES
"  HEART CARE ENCOUNTER CONSULTATON NOTE      [facility]  [phone]      Assessment/Recommendations   Assessment/Plan:    Gloria Myers is a very pleasant 63 year old female with PMH of hypothyroidism who presents today to the EP clinic.    SVT  - likely AT  - on metoprolol since ER visit  - symptoms well controlled  - will continue the medication for now and see in 6 months    2. Orthostatic hypotension  - Aggressive hydration      Time spent: 30 minutes spent on the date of the encounter doing chart review, history and exam, documentation and further activities as noted above.         History of Present Illness/Subjective    HPI: Gloria Myers is a very pleasant 63 year old female with PMH of hypothyroidism who presents today to the EP clinic.    Gloria was recently seen in the ER for palpitations which appear to be short runs of SVT, likely AT. She was started on Metoprolol and discharged home. She has not had any symptoms since starting the medication.    She has had palpitations and a feeling of anxiety which started about 4 weeks ago. They occur several times a day and longest lasting episode was 30 minutes. No lightheadedness or syncope with the palpitations.    2 cups of coffee a day.  Wine 4 times a week.    She lost her son in May and has understandably been in a lot of stress and grief since then.    May 2024    She describes orthostatic symptoms when she gets up from a sitting to a standing position. She has been compliant with her meds and has not had any recurrent symptoms.    She drinks about 16 oz of water daily      Labs below reviewed personally     Physical Examination  Review of Systems   Vitals: /82 (BP Location: Right arm, Patient Position: Sitting, Cuff Size: Adult Regular)   Pulse 60   Resp 16   Ht 1.708 m (5' 7.25\")   Wt 62.1 kg (137 lb)   LMP  (LMP Unknown)   BMI 21.30 kg/m    BMI= Body mass index is 21.3 kg/m .  Wt Readings from Last 3 Encounters: "   05/29/24 62.1 kg (137 lb)   01/05/24 59.6 kg (131 lb 6.4 oz)   11/30/23 59.9 kg (132 lb)       General Appearance:   no distress, normal body habitus   ENT/Mouth: membranes moist, no oral lesions or bleeding gums.      EYES:  no scleral icterus, normal conjunctivae   Neck: no carotid bruits or thyromegaly   Chest/Lungs:   lungs are clear to auscultation, no rales or wheezing,  sternal scar, equal chest wall expansion    Cardiovascular:   Regular. Normal first and second heart sounds with no murmurs, rubs, or gallops; the carotid, radial and posterior tibial pulses are intact, no edema bilaterally    Abdomen:  no organomegaly, masses, bruits, or tenderness; bowel sounds are present   Extremities: no cyanosis or clubbing   Skin: no xanthelasma, warm.    Neurologic: normal  bilateral, no tremors     Psychiatric: alert and oriented x3, calm        Please refer above for cardiac ROS details.        Medical History  Surgical History Family History Social History   Past Medical History:   Diagnosis Date    Anxiety     Cancer (H) 2019    Skin cancer    Fibroids     Hypothyroidism     Vaginal atrophy      Past Surgical History:   Procedure Laterality Date    COLONOSCOPY      FL CYSTOURETHROSCOPY,FULGUR <0.5 CM LESN      Description: Cystoscopy With Fulguration Minor Lesion (Under 5mm);  Recorded: 12/31/2012;     Family History   Problem Relation Age of Onset    Hypothyroidism Mother     Narcolepsy Mother     Asthma Mother     Heart Disease Father     Asthma Sister     Asthma Sister     Hypothyroidism Sister     Asthma Brother     No Known Problems Brother     Breast Cancer Maternal Grandmother         Unsure of age    Suicide Son         40    No Known Problems Son     Breast Cancer Cousin     Breast Cancer Cousin     Breast Cancer Cousin         Social History     Socioeconomic History    Marital status:      Spouse name: Not on file    Number of children: Not on file    Years of education: Not on file     Highest education level: Not on file   Occupational History    Not on file   Tobacco Use    Smoking status: Former     Current packs/day: 0.00     Average packs/day: 0.3 packs/day for 3.0 years (0.8 ttl pk-yrs)     Types: Cigarettes     Start date: 1977     Quit date: 1980     Years since quittin.3    Smokeless tobacco: Never   Vaping Use    Vaping status: Never Used   Substance and Sexual Activity    Alcohol use: Yes     Alcohol/week: 1.0 standard drink of alcohol     Comment: Alcoholic Drinks/day: On ocassions    Drug use: No    Sexual activity: Not Currently     Partners: Male     Birth control/protection: Post-menopausal   Other Topics Concern    Parent/sibling w/ CABG, MI or angioplasty before 65F 55M? No   Social History Narrative    Lives .     Social Determinants of Health     Financial Resource Strain: Low Risk  (2023)    Financial Resource Strain     Within the past 12 months, have you or your family members you live with been unable to get utilities (heat, electricity) when it was really needed?: No   Food Insecurity: Low Risk  (2023)    Food Insecurity     Within the past 12 months, did you worry that your food would run out before you got money to buy more?: No     Within the past 12 months, did the food you bought just not last and you didn t have money to get more?: No   Transportation Needs: Low Risk  (2023)    Transportation Needs     Within the past 12 months, has lack of transportation kept you from medical appointments, getting your medicines, non-medical meetings or appointments, work, or from getting things that you need?: No   Physical Activity: Not on file   Stress: Not on file   Social Connections: Not on file   Interpersonal Safety: Low Risk  (2024)    Interpersonal Safety     Do you feel physically and emotionally safe where you currently live?: Yes     Within the past 12 months, have you been hit, slapped, kicked or otherwise physically hurt by  "someone?: No     Within the past 12 months, have you been humiliated or emotionally abused in other ways by your partner or ex-partner?: No   Housing Stability: Low Risk  (12/29/2023)    Housing Stability     Do you have housing? : Yes     Are you worried about losing your housing?: No           Medications  Allergies   Current Outpatient Medications   Medication Sig Dispense Refill    calcium-vitamin D (CALCIUM-VITAMIN D) 500 mg(1,250mg) -200 unit per tablet [CALCIUM-VITAMIN D (CALCIUM-VITAMIN D) 500 MG(1,250MG) -200 UNIT PER TABLET] Take 1 tablet by mouth 2 (two) times a day with meals.      estradiol (ESTRACE) 0.1 MG/GM vaginal cream [ESTRADIOL (ESTRACE) 0.01 % (0.1 MG/GRAM) VAGINAL CREAM] Use 0.5 gm weekly 42.5 g 5    hydrOXYzine HCl (ATARAX) 25 MG tablet Take 1 tablet (25 mg) by mouth every 6 hours as needed for itching or anxiety 30 tablet 0    ipratropium (ATROVENT) 0.06 % nasal spray Spray 2 sprays into both nostrils 3 times daily as needed for rhinitis 30 mL 11    levothyroxine (SYNTHROID/LEVOTHROID) 100 MCG tablet Take 1 tablet (100 mcg) by mouth five times a week 90 tablet 1    metoprolol tartrate (LOPRESSOR) 25 MG tablet Take 1 tablet (25 mg) by mouth 2 times daily 60 tablet 11     No Known Allergies       Lab Results    Chemistry/lipid CBC Cardiac Enzymes/BNP/TSH/INR   Recent Labs   Lab Test 10/03/22  0800   CHOL 232*      *   TRIG 63     Recent Labs   Lab Test 10/03/22  0800 08/04/21  1748 07/10/20  1352   * 93 112     Recent Labs   Lab Test 11/16/23  1505      POTASSIUM 3.9   CHLORIDE 106   CO2 27   *   BUN 19.6   CR 0.87   GFRESTIMATED 74   ZACHARY 9.9     Recent Labs   Lab Test 11/16/23  1505   CR 0.87     No results for input(s): \"A1C\" in the last 46698 hours.       Recent Labs   Lab Test 11/16/23  1505   WBC 5.4   HGB 13.6   HCT 40.2   MCV 93        Recent Labs   Lab Test 11/16/23  1505 10/03/22  0800 08/04/21  1748   HGB 13.6 13.3 12.9    No results for " "input(s): \"TROPONINI\" in the last 56672 hours.  No results for input(s): \"BNP\", \"NTBNPI\", \"NTBNP\" in the last 37153 hours.  Recent Labs   Lab Test 11/16/23  1505   TSH 0.06*     No results for input(s): \"INR\" in the last 89894 hours.     Toña Cerda MD                                      "

## 2024-05-29 ENCOUNTER — OFFICE VISIT (OUTPATIENT)
Dept: CARDIOLOGY | Facility: CLINIC | Age: 64
End: 2024-05-29
Payer: COMMERCIAL

## 2024-05-29 VITALS
HEIGHT: 67 IN | SYSTOLIC BLOOD PRESSURE: 124 MMHG | DIASTOLIC BLOOD PRESSURE: 82 MMHG | HEART RATE: 60 BPM | RESPIRATION RATE: 16 BRPM | BODY MASS INDEX: 21.5 KG/M2 | WEIGHT: 137 LBS

## 2024-05-29 DIAGNOSIS — I47.19 NARROW COMPLEX TACHYCARDIA (H): ICD-10-CM

## 2024-05-29 PROCEDURE — 99214 OFFICE O/P EST MOD 30 MIN: CPT | Performed by: INTERNAL MEDICINE

## 2024-05-29 NOTE — LETTER
5/29/2024    Jesus Johnson, ZACK CNP  [address] [address]    RE: Gloria Myers       Dear Colleague,     I had the pleasure of seeing Gloria Myers in the [facility].    HEART CARE ENCOUNTER CONSULTATON NOTE      [facility]  [phone]      Assessment/Recommendations   Assessment/Plan:    Gloria Myers is a very pleasant 63 year old female with PMH of hypothyroidism who presents today to the EP clinic.    SVT  - likely AT  - on metoprolol since ER visit  - symptoms well controlled  - will continue the medication for now and see in 6 months    2. Orthostatic hypotension  - Aggressive hydration      Time spent: 30 minutes spent on the date of the encounter doing chart review, history and exam, documentation and further activities as noted above.         History of Present Illness/Subjective    HPI: Gloria Myers is a very pleasant 63 year old female with PMH of hypothyroidism who presents today to the EP clinic.    Gloria was recently seen in the ER for palpitations which appear to be short runs of SVT, likely AT. She was started on Metoprolol and discharged home. She has not had any symptoms since starting the medication.    She has had palpitations and a feeling of anxiety which started about 4 weeks ago. They occur several times a day and longest lasting episode was 30 minutes. No lightheadedness or syncope with the palpitations.    2 cups of coffee a day.  Wine 4 times a week.    She lost her son in May and has understandably been in a lot of stress and grief since then.    May 2024    She describes orthostatic symptoms when she gets up from a sitting to a standing position. She has been compliant with her meds and has not had any recurrent symptoms.    She drinks about 16 oz of water daily      Labs below reviewed personally     Physical Examination  Review of Systems   Vitals: /82 (BP Location: Right arm, Patient Position: Sitting, Cuff Size:  "Adult Regular)   Pulse 60   Resp 16   Ht 1.708 m (5' 7.25\")   Wt 62.1 kg (137 lb)   LMP  (LMP Unknown)   BMI 21.30 kg/m    BMI= Body mass index is 21.3 kg/m .  Wt Readings from Last 3 Encounters:   05/29/24 62.1 kg (137 lb)   01/05/24 59.6 kg (131 lb 6.4 oz)   11/30/23 59.9 kg (132 lb)       General Appearance:   no distress, normal body habitus   ENT/Mouth: membranes moist, no oral lesions or bleeding gums.      EYES:  no scleral icterus, normal conjunctivae   Neck: no carotid bruits or thyromegaly   Chest/Lungs:   lungs are clear to auscultation, no rales or wheezing,  sternal scar, equal chest wall expansion    Cardiovascular:   Regular. Normal first and second heart sounds with no murmurs, rubs, or gallops; the carotid, radial and posterior tibial pulses are intact, no edema bilaterally    Abdomen:  no organomegaly, masses, bruits, or tenderness; bowel sounds are present   Extremities: no cyanosis or clubbing   Skin: no xanthelasma, warm.    Neurologic: normal  bilateral, no tremors     Psychiatric: alert and oriented x3, calm        Please refer above for cardiac ROS details.        Medical History  Surgical History Family History Social History   Past Medical History:   Diagnosis Date    Anxiety     Cancer (H) 2019    Skin cancer    Fibroids     Hypothyroidism     Vaginal atrophy      Past Surgical History:   Procedure Laterality Date    COLONOSCOPY      AR CYSTOURETHROSCOPY,FULGUR <0.5 CM ADRIANA      Description: Cystoscopy With Fulguration Minor Lesion (Under 5mm);  Recorded: 12/31/2012;     Family History   Problem Relation Age of Onset    Hypothyroidism Mother     Narcolepsy Mother     Asthma Mother     Heart Disease Father     Asthma Sister     Asthma Sister     Hypothyroidism Sister     Asthma Brother     No Known Problems Brother     Breast Cancer Maternal Grandmother         Unsure of age    Suicide Son         40    No Known Problems Son     Breast Cancer Cousin     Breast Cancer Cousin     " Breast Cancer Cousin         Social History     Socioeconomic History    Marital status:      Spouse name: Not on file    Number of children: Not on file    Years of education: Not on file    Highest education level: Not on file   Occupational History    Not on file   Tobacco Use    Smoking status: Former     Current packs/day: 0.00     Average packs/day: 0.3 packs/day for 3.0 years (0.8 ttl pk-yrs)     Types: Cigarettes     Start date: 1977     Quit date: 1980     Years since quittin.3    Smokeless tobacco: Never   Vaping Use    Vaping status: Never Used   Substance and Sexual Activity    Alcohol use: Yes     Alcohol/week: 1.0 standard drink of alcohol     Comment: Alcoholic Drinks/day: On ocassions    Drug use: No    Sexual activity: Not Currently     Partners: Male     Birth control/protection: Post-menopausal   Other Topics Concern    Parent/sibling w/ CABG, MI or angioplasty before 65F 55M? No   Social History Narrative    Lives .     Social Determinants of Health     Financial Resource Strain: Low Risk  (2023)    Financial Resource Strain     Within the past 12 months, have you or your family members you live with been unable to get utilities (heat, electricity) when it was really needed?: No   Food Insecurity: Low Risk  (2023)    Food Insecurity     Within the past 12 months, did you worry that your food would run out before you got money to buy more?: No     Within the past 12 months, did the food you bought just not last and you didn t have money to get more?: No   Transportation Needs: Low Risk  (2023)    Transportation Needs     Within the past 12 months, has lack of transportation kept you from medical appointments, getting your medicines, non-medical meetings or appointments, work, or from getting things that you need?: No   Physical Activity: Not on file   Stress: Not on file   Social Connections: Not on file   Interpersonal Safety: Low Risk  (2024)     "Interpersonal Safety     Do you feel physically and emotionally safe where you currently live?: Yes     Within the past 12 months, have you been hit, slapped, kicked or otherwise physically hurt by someone?: No     Within the past 12 months, have you been humiliated or emotionally abused in other ways by your partner or ex-partner?: No   Housing Stability: Low Risk  (12/29/2023)    Housing Stability     Do you have housing? : Yes     Are you worried about losing your housing?: No           Medications  Allergies   Current Outpatient Medications   Medication Sig Dispense Refill    calcium-vitamin D (CALCIUM-VITAMIN D) 500 mg(1,250mg) -200 unit per tablet [CALCIUM-VITAMIN D (CALCIUM-VITAMIN D) 500 MG(1,250MG) -200 UNIT PER TABLET] Take 1 tablet by mouth 2 (two) times a day with meals.      estradiol (ESTRACE) 0.1 MG/GM vaginal cream [ESTRADIOL (ESTRACE) 0.01 % (0.1 MG/GRAM) VAGINAL CREAM] Use 0.5 gm weekly 42.5 g 5    hydrOXYzine HCl (ATARAX) 25 MG tablet Take 1 tablet (25 mg) by mouth every 6 hours as needed for itching or anxiety 30 tablet 0    ipratropium (ATROVENT) 0.06 % nasal spray Spray 2 sprays into both nostrils 3 times daily as needed for rhinitis 30 mL 11    levothyroxine (SYNTHROID/LEVOTHROID) 100 MCG tablet Take 1 tablet (100 mcg) by mouth five times a week 90 tablet 1    metoprolol tartrate (LOPRESSOR) 25 MG tablet Take 1 tablet (25 mg) by mouth 2 times daily 60 tablet 11     No Known Allergies       Lab Results    Chemistry/lipid CBC Cardiac Enzymes/BNP/TSH/INR   Recent Labs   Lab Test 10/03/22  0800   CHOL 232*      *   TRIG 63     Recent Labs   Lab Test 10/03/22  0800 08/04/21  1748 07/10/20  1352   * 93 112     Recent Labs   Lab Test 11/16/23  1505      POTASSIUM 3.9   CHLORIDE 106   CO2 27   *   BUN 19.6   CR 0.87   GFRESTIMATED 74   ZACHARY 9.9     Recent Labs   Lab Test 11/16/23  1505   CR 0.87     No results for input(s): \"A1C\" in the last 29910 hours.       Recent " "Labs   Lab Test 11/16/23  1505   WBC 5.4   HGB 13.6   HCT 40.2   MCV 93        Recent Labs   Lab Test 11/16/23  1505 10/03/22  0800 08/04/21  1748   HGB 13.6 13.3 12.9    No results for input(s): \"TROPONINI\" in the last 89602 hours.  No results for input(s): \"BNP\", \"NTBNPI\", \"NTBNP\" in the last 52445 hours.  Recent Labs   Lab Test 11/16/23  1505   TSH 0.06*     No results for input(s): \"INR\" in the last 54830 hours.     Toña Cerda MD      Thank you for allowing me to participate in the care of your patient.      Sincerely,   Toña Cerda MD   [facility]  cc: Referred Self,   "

## 2024-05-29 NOTE — PATIENT INSTRUCTIONS
[facility]  Cardiac Electrophysiology  [address]  [place] [address]   Office: [phone]  Fax: [phone]       Thank you for seeing us in clinic today - it is a pleasure to be a part of your care team.  Below is a summary of our plan from today's visit.      Improve hydration  Continue metoprolol  Follow up in one year    Please do not hesitate to be in touch with our office at [phone] with any questions that may arise.      Thank you for trusting us with your care,    Toña Cerda MD  Clinical Cardiac Electrophysiology  [facility]  [address]  [place] [address]   Office: [phone]  Fax: [phone]

## 2024-06-13 ENCOUNTER — ANCILLARY PROCEDURE (OUTPATIENT)
Dept: MAMMOGRAPHY | Facility: CLINIC | Age: 64
End: 2024-06-13
Payer: COMMERCIAL

## 2024-06-13 DIAGNOSIS — Z12.31 ENCOUNTER FOR SCREENING MAMMOGRAM FOR BREAST CANCER: ICD-10-CM

## 2024-06-13 PROCEDURE — 77063 BREAST TOMOSYNTHESIS BI: CPT

## 2024-09-09 DIAGNOSIS — E03.9 HYPOTHYROIDISM, UNSPECIFIED TYPE: ICD-10-CM

## 2024-09-09 RX ORDER — LEVOTHYROXINE SODIUM 100 UG/1
100 TABLET ORAL
Qty: 90 TABLET | Refills: 1 | Status: SHIPPED | OUTPATIENT
Start: 2024-09-09

## 2024-09-09 NOTE — TELEPHONE ENCOUNTER
Pharmacy calling to get a medication refill on medication(s) attached     Disp Refills Start End LUCY   levothyroxine (SYNTHROID/LEVOTHROID) 100 MCG tablet 90 tablet 1 4/4/2024 -- No   Sig - Route: Take 1 tablet (100 mcg) by mouth five times a week - Oral   Sent to pharmacy as: Levothyroxine Sodium 100 MCG Oral Tablet (SYNTHROID/LEVOTHROID)   Class: E-Prescribe   Order: [id number]

## 2024-12-29 ENCOUNTER — MYC REFILL (OUTPATIENT)
Dept: CARDIOLOGY | Facility: CLINIC | Age: 64
End: 2024-12-29
Payer: COMMERCIAL

## 2024-12-29 DIAGNOSIS — I47.19 NARROW COMPLEX TACHYCARDIA (H): ICD-10-CM

## 2024-12-30 RX ORDER — METOPROLOL TARTRATE 25 MG/1
25 TABLET, FILM COATED ORAL 2 TIMES DAILY
Qty: 90 TABLET | Refills: 1 | Status: SHIPPED | OUTPATIENT
Start: 2024-12-30

## 2025-02-15 ENCOUNTER — HEALTH MAINTENANCE LETTER (OUTPATIENT)
Age: 65
End: 2025-02-15

## 2025-04-01 ENCOUNTER — MYC REFILL (OUTPATIENT)
Dept: CARDIOLOGY | Facility: CLINIC | Age: 65
End: 2025-04-01
Payer: COMMERCIAL

## 2025-04-01 DIAGNOSIS — I47.19 NARROW COMPLEX TACHYCARDIA: ICD-10-CM

## 2025-04-01 RX ORDER — METOPROLOL TARTRATE 25 MG/1
25 TABLET, FILM COATED ORAL 2 TIMES DAILY
Qty: 90 TABLET | Refills: 1 | Status: SHIPPED | OUTPATIENT
Start: 2025-04-01

## 2025-05-14 ENCOUNTER — PATIENT OUTREACH (OUTPATIENT)
Dept: CARE COORDINATION | Facility: CLINIC | Age: 65
End: 2025-05-14
Payer: COMMERCIAL

## 2025-05-16 DIAGNOSIS — E03.9 HYPOTHYROIDISM, UNSPECIFIED TYPE: ICD-10-CM

## 2025-05-16 NOTE — TELEPHONE ENCOUNTER
Medication Question or Refill        What medication are you calling about (include dose and sig)?:    Disp Refills Start End LUCY   levothyroxine (SYNTHROID/LEVOTHROID) 100 MCG tablet 90 tablet 1 9/9/2024 -- No   Sig - Route: Take 1 tablet (100 mcg) by mouth five times a week. - Oral   Sent to pharmacy as: Levothyroxine Sodium 100 MCG Oral Tablet (SYNTHROID/LEVOTHROID)   Class: E-Prescribe   Order: [id number]   E-Prescribing Status: Receipt confirmed by pharmacy (9/9/2024  3:24 PM CDT)       Preferred Pharmacy:    [facility]/pharmacy #[id number] - [address]  [address] [place] [address] [address]  [facility] [address]  Phone: [phone] Fax: [phone]      Controlled Substance Agreement on file:   CSA -- Patient Level:    CSA: None found at the patient level.       Who prescribed the medication?: PCP    Do you need a refill? Yes      Could we send this information to you in [facility] or would you prefer to receive a phone call?:   Patient would prefer a phone call   Okay to leave a detailed message?: Yes at Cell number on file:    Telephone Information:   Mobile [phone]

## 2025-05-17 RX ORDER — LEVOTHYROXINE SODIUM 100 UG/1
100 TABLET ORAL
Qty: 20 TABLET | Refills: 1 | Status: SHIPPED | OUTPATIENT
Start: 2025-05-19

## 2025-06-16 ENCOUNTER — ANCILLARY PROCEDURE (OUTPATIENT)
Dept: MAMMOGRAPHY | Facility: CLINIC | Age: 65
End: 2025-06-16
Payer: COMMERCIAL

## 2025-06-16 DIAGNOSIS — Z12.31 VISIT FOR SCREENING MAMMOGRAM: ICD-10-CM

## 2025-06-16 PROCEDURE — 77063 BREAST TOMOSYNTHESIS BI: CPT

## 2025-07-03 ENCOUNTER — OFFICE VISIT (OUTPATIENT)
Dept: CARDIOLOGY | Facility: CLINIC | Age: 65
End: 2025-07-03
Payer: COMMERCIAL

## 2025-07-03 VITALS
OXYGEN SATURATION: 97 % | HEART RATE: 64 BPM | RESPIRATION RATE: 16 BRPM | DIASTOLIC BLOOD PRESSURE: 70 MMHG | WEIGHT: 138.6 LBS | SYSTOLIC BLOOD PRESSURE: 110 MMHG | BODY MASS INDEX: 21.55 KG/M2

## 2025-07-03 DIAGNOSIS — I47.19 NARROW COMPLEX TACHYCARDIA: ICD-10-CM

## 2025-07-03 RX ORDER — LEVOTHYROXINE SODIUM 100 UG/1
CAPSULE ORAL
COMMUNITY
Start: 2024-04-04

## 2025-07-03 RX ORDER — METOPROLOL SUCCINATE 50 MG/1
50 TABLET, EXTENDED RELEASE ORAL DAILY
Qty: 90 TABLET | Refills: 3 | Status: SHIPPED | OUTPATIENT
Start: 2025-07-03

## 2025-07-03 NOTE — PROGRESS NOTES
[facility]  Cardiac Electrophysiology  [address]  [place] [address]   Office: [phone]  Fax: [phone]     HEART CARE ELECTROPHYSIOLOGY FOLLOW UP    Primary Care: Sam Basurto MD      Assessment/Recommendations     SVT: Likely atrial tachycardia.  Symptoms of palpitations which appear to be short runs of SVT.  Was started on metoprolol which has improved symptoms.    Plan:  Will switch from metoprolol to tartrate to metoprolol succinate for ease of taking.  Can start metoprolol succinate 50 mg daily  Increase hydration to at least 60 ounces of fluid per day, ideally 80.  Especially if symptoms persist  Follow-up 1 year       History of Present Illness/Subjective    Gloria Myers is a 64 year old female with past medical history significant for hypothyroidism and SVT.  She is seen today for follow-up of SVT.    She was seen in the ER in November 2023 for palpitations.  She was found to be in narrow complex tachycardia.  She was started on metoprolol following ER visit.  She saw Dr. Cerda in clinic May 2024.  She notes improved symptoms while on metoprolol.    She has been feeling well since her visit a year ago.  She has had no recurrence of the SVT.  She will occasionally forget to take her metoprolol.  She has tried to increase her fluid intake, but still somewhat struggles.  We discussed that ideally plain water is best, but could supplement with carbonated water or water flavoring, or other noncaffeinated drinks as well.  She denies chest discomfort, palpitations, shortness of breath, lightheadedness/dizziness, pedal edema, or syncope.       Data Review     EKG  1/5/2024: Sinus rhythm 62 bpm  11/16/2023: Sinus rhythm with run of  bpm  Personally reviewed.     I have reviewed and updated the patient's past medical history, allergy list and medication list.          Physical Examination   BMI= There is no height or weight on file to calculate  BMI.    Wt Readings from Last 3 Encounters:   05/29/24 62.1 kg (137 lb)   01/05/24 59.6 kg (131 lb 6.4 oz)   11/30/23 59.9 kg (132 lb)       Vitals: LMP  (LMP Unknown)   General   Appearance:   Alert and oriented, in no acute distress.    HEENT:  Normocephalic and atraumatic. Conjunctiva and sclera are clear. Moist oral mucosa.    Neck: No JVP, carotid bruit or obvious thyromegaly.   Lungs:   Respirations unlabored. Clear bilaterally with no rales, rhonchi, or wheezes.     Cardiovascular:   Rhythm is regular. S1 and S2 are normal. No significant murmur is present. Lower extremities demonstrate no significant edema. Posterior tibial pulses are intact bilaterally.   Extremities: No cyanosis or clubbing   Skin: Skin is warm, dry, and otherwise intact.   Neurologic: Gait not assessed. Mood and affect appropriate.           Medical History  Surgical History Family History Social History   Past Medical History:   Diagnosis Date    Anxiety     Cancer (H) 2019    Skin cancer    Fibroids     Hypothyroidism     Vaginal atrophy     Past Surgical History:   Procedure Laterality Date    COLONOSCOPY      MS CYSTOURETHROSCOPY,FULGUR <0.5 CM LESN      Description: Cystoscopy With Fulguration Minor Lesion (Under 5mm);  Recorded: 12/31/2012;    Family History   Problem Relation Age of Onset    Hypothyroidism Mother     Narcolepsy Mother     Asthma Mother     Heart Disease Father     Asthma Sister     Asthma Sister     Hypothyroidism Sister     Asthma Brother     No Known Problems Brother     Breast Cancer Maternal Grandmother         Unsure of age    Suicide Son         40    No Known Problems Son     Breast Cancer Cousin     Breast Cancer Cousin     Breast Cancer Cousin     Social History     Socioeconomic History    Marital status:      Spouse name: Not on file    Number of children: Not on file    Years of education: Not on file    Highest education level: Not on file   Occupational History    Not on file   Tobacco Use     Smoking status: Former     Current packs/day: 0.00     Average packs/day: 0.3 packs/day for 3.0 years (0.8 ttl pk-yrs)     Types: Cigarettes     Start date: 1977     Quit date: 1980     Years since quittin.4    Smokeless tobacco: Never   Vaping Use    Vaping status: Never Used   Substance and Sexual Activity    Alcohol use: Yes     Alcohol/week: 1.0 standard drink of alcohol     Comment: Alcoholic Drinks/day: On ocassions    Drug use: No    Sexual activity: Not Currently     Partners: Male     Birth control/protection: Post-menopausal   Other Topics Concern    Parent/sibling w/ CABG, MI or angioplasty before 65F 55M? No   Social History Narrative    Lives .     Social Drivers of Health     Financial Resource Strain: Low Risk  (2023)    Financial Resource Strain     Within the past 12 months, have you or your family members you live with been unable to get utilities (heat, electricity) when it was really needed?: No   Food Insecurity: Low Risk  (2023)    Food Insecurity     Within the past 12 months, did you worry that your food would run out before you got money to buy more?: No     Within the past 12 months, did the food you bought just not last and you didn t have money to get more?: No   Transportation Needs: Low Risk  (2023)    Transportation Needs     Within the past 12 months, has lack of transportation kept you from medical appointments, getting your medicines, non-medical meetings or appointments, work, or from getting things that you need?: No   Physical Activity: Not on file   Stress: Not on file   Social Connections: Not on file   Interpersonal Safety: Low Risk  (2024)    Interpersonal Safety     Do you feel physically and emotionally safe where you currently live?: Yes     Within the past 12 months, have you been hit, slapped, kicked or otherwise physically hurt by someone?: No     Within the past 12 months, have you been humiliated or emotionally abused in  other ways by your partner or ex-partner?: No   Housing Stability: Low Risk  (12/29/2023)    Housing Stability     Do you have housing? : Yes     Are you worried about losing your housing?: No          Medications  Allergies   Scheduled Meds:  Current Outpatient Medications   Medication Sig Dispense Refill    calcium-vitamin D (CALCIUM-VITAMIN D) 500 mg(1,250mg) -200 unit per tablet [CALCIUM-VITAMIN D (CALCIUM-VITAMIN D) 500 MG(1,250MG) -200 UNIT PER TABLET] Take 1 tablet by mouth 2 (two) times a day with meals.      estradiol (ESTRACE) 0.1 MG/GM vaginal cream [ESTRADIOL (ESTRACE) 0.01 % (0.1 MG/GRAM) VAGINAL CREAM] Use 0.5 gm weekly 42.5 g 5    hydrOXYzine HCl (ATARAX) 25 MG tablet Take 1 tablet (25 mg) by mouth every 6 hours as needed for itching or anxiety 30 tablet 0    ipratropium (ATROVENT) 0.06 % nasal spray Spray 2 sprays into both nostrils 3 times daily as needed for rhinitis 30 mL 11    levothyroxine (SYNTHROID/LEVOTHROID) 100 MCG tablet Take 1 tablet (100 mcg) by mouth five times a week. Last fill 5/17, needs apt 20 tablet 1    metoprolol tartrate (LOPRESSOR) 25 MG tablet Take 1 tablet (25 mg) by mouth 2 times daily. 60 tablet 0    No Known Allergies      Lab Results    Chemistry/lipid CBC Cardiac Enzymes/BNP/TSH/INR   Lab Results   Component Value Date    CHOL 213 (H) 01/05/2024    HDL 86 01/05/2024    TRIG 53 01/05/2024    BUN 19.0 01/05/2024     01/05/2024    CO2 26 01/05/2024    Lab Results   Component Value Date    WBC 5.4 11/16/2023    HGB 13.6 11/16/2023    HCT 40.2 11/16/2023    MCV 93 11/16/2023     11/16/2023    @RESUFAST(BMP,CBC,BNP,TSH,  INR)@      25 minutes spent reviewing prior records (including documentation, laboratory studies, cardiac testing/imaging), history and physical exam, planning, and subsequent documentation.     The longitudinal plan of care for the diagnosis(es)/condition(s) as documented were addressed during this visit. Due to the added complexity in care, I  will continue to support Dot in the subsequent management and with ongoing continuity of care.      This note has been dictated using voice recognition software. Any grammatical, typographical, or context distortions are unintentional and inherent to the software.    Driss Nguyen PA-C  Cardiac Electrophysiology  [facility]  Clinic and [phone]  Fax: [phone]  Electrophysiology Nurses: [phone]

## 2025-07-03 NOTE — PATIENT INSTRUCTIONS
Gloria Myers,    It was a pleasure to see you today at the [facility].     My recommendations after this visit include:  - will change from short acting metoprolol to long acting for ease. Start taking metoprolol succinate 50 mg daily. Can stop metoprolol tartrate.   - try to increase hydration to minimum of 60 oz of fluid per day.   - follow up 1 year or sooner if needed    Please do not hesitate to call with additional questions or concerns.     Driss Nguyen PA-C  Clinical Cardiac Electrophysiology  [facility]  Clinic and [phone]  Fax: [phone]  Electrophysiology Nurses: [phone]

## 2025-07-03 NOTE — LETTER
7/3/2025    Sam Basurto NP  [facility] [id number]  [facility] [address]    RE: Gloria Myers       Dear Colleague,     I had the [facility] of seeing Gloria Myers in the [facility].     [facility]  Cardiac Electrophysiology  [address]  [place] [address]   Office: [phone]  Fax: [phone]     HEART CARE ELECTROPHYSIOLOGY FOLLOW UP    Primary Care: Sam Basurto MD      Assessment/Recommendations     SVT: Likely atrial tachycardia.  Symptoms of palpitations which appear to be short runs of SVT.  Was started on metoprolol which has improved symptoms.    Plan:  Will switch from metoprolol to tartrate to metoprolol succinate for ease of taking.  Can start metoprolol succinate 50 mg daily  Increase hydration to at least 60 ounces of fluid per day, ideally 80.  Especially if symptoms persist  Follow-up 1 year       History of Present Illness/Subjective    Gloria Myers is a 64 year old female with past medical history significant for hypothyroidism and SVT.  She is seen today for follow-up of SVT.    She was seen in the ER in November 2023 for palpitations.  She was found to be in narrow complex tachycardia.  She was started on metoprolol following ER visit.  She saw Dr. Cerda in clinic May 2024.  She notes improved symptoms while on metoprolol.    She has been feeling well since her visit a year ago.  She has had no recurrence of the SVT.  She will occasionally forget to take her metoprolol.  She has tried to increase her fluid intake, but still somewhat struggles.  We discussed that ideally plain water is best, but could supplement with carbonated water or water flavoring, or other noncaffeinated drinks as well.  She denies chest discomfort, palpitations, shortness of breath, lightheadedness/dizziness, pedal edema, or syncope.       Data Review     EKG  1/5/2024: Sinus rhythm 62 bpm  11/16/2023: Sinus rhythm  with run of  bpm  Personally reviewed.     I have reviewed and updated the patient's past medical history, allergy list and medication list.          Physical Examination   BMI= There is no height or weight on file to calculate BMI.    Wt Readings from Last 3 Encounters:   05/29/24 62.1 kg (137 lb)   01/05/24 59.6 kg (131 lb 6.4 oz)   11/30/23 59.9 kg (132 lb)       Vitals: LMP  (LMP Unknown)   General   Appearance:   Alert and oriented, in no acute distress.    HEENT:  Normocephalic and atraumatic. Conjunctiva and sclera are clear. Moist oral mucosa.    Neck: No JVP, carotid bruit or obvious thyromegaly.   Lungs:   Respirations unlabored. Clear bilaterally with no rales, rhonchi, or wheezes.     Cardiovascular:   Rhythm is regular. S1 and S2 are normal. No significant murmur is present. Lower extremities demonstrate no significant edema. Posterior tibial pulses are intact bilaterally.   Extremities: No cyanosis or clubbing   Skin: Skin is warm, dry, and otherwise intact.   Neurologic: Gait not assessed. Mood and affect appropriate.           Medical History  Surgical History Family History Social History   Past Medical History:   Diagnosis Date     Anxiety      Cancer (H) 2019    Skin cancer     Fibroids      Hypothyroidism      Vaginal atrophy     Past Surgical History:   Procedure Laterality Date     COLONOSCOPY       VT CYSTOURETHROSCOPY,FULGUR <0.5 CM ADRIANA      Description: Cystoscopy With Fulguration Minor Lesion (Under 5mm);  Recorded: 12/31/2012;    Family History   Problem Relation Age of Onset     Hypothyroidism Mother      Narcolepsy Mother      Asthma Mother      Heart Disease Father      Asthma Sister      Asthma Sister      Hypothyroidism Sister      Asthma Brother      No Known Problems Brother      Breast Cancer Maternal Grandmother         Unsure of age     Suicide Son         40     No Known Problems Son      Breast Cancer Cousin      Breast Cancer Cousin      Breast Cancer Cousin     Social  History     Socioeconomic History     Marital status:      Spouse name: Not on file     Number of children: Not on file     Years of education: Not on file     Highest education level: Not on file   Occupational History     Not on file   Tobacco Use     Smoking status: Former     Current packs/day: 0.00     Average packs/day: 0.3 packs/day for 3.0 years (0.8 ttl pk-yrs)     Types: Cigarettes     Start date: 1977     Quit date: 1980     Years since quittin.4     Smokeless tobacco: Never   Vaping Use     Vaping status: Never Used   Substance and Sexual Activity     Alcohol use: Yes     Alcohol/week: 1.0 standard drink of alcohol     Comment: Alcoholic Drinks/day: On ocassions     Drug use: No     Sexual activity: Not Currently     Partners: Male     Birth control/protection: Post-menopausal   Other Topics Concern     Parent/sibling w/ CABG, MI or angioplasty before 65F 55M? No   Social History Narrative    Lives .     Social Drivers of Health     Financial Resource Strain: Low Risk  (2023)    Financial Resource Strain      Within the past 12 months, have you or your family members you live with been unable to get utilities (heat, electricity) when it was really needed?: No   Food Insecurity: Low Risk  (2023)    Food Insecurity      Within the past 12 months, did you worry that your food would run out before you got money to buy more?: No      Within the past 12 months, did the food you bought just not last and you didn t have money to get more?: No   Transportation Needs: Low Risk  (2023)    Transportation Needs      Within the past 12 months, has lack of transportation kept you from medical appointments, getting your medicines, non-medical meetings or appointments, work, or from getting things that you need?: No   Physical Activity: Not on file   Stress: Not on file   Social Connections: Not on file   Interpersonal Safety: Low Risk  (2024)    Interpersonal Safety       Do you feel physically and emotionally safe where you currently live?: Yes      Within the past 12 months, have you been hit, slapped, kicked or otherwise physically hurt by someone?: No      Within the past 12 months, have you been humiliated or emotionally abused in other ways by your partner or ex-partner?: No   Housing Stability: Low Risk  (12/29/2023)    Housing Stability      Do you have housing? : Yes      Are you worried about losing your housing?: No          Medications  Allergies   Scheduled Meds:  Current Outpatient Medications   Medication Sig Dispense Refill     calcium-vitamin D (CALCIUM-VITAMIN D) 500 mg(1,250mg) -200 unit per tablet [CALCIUM-VITAMIN D (CALCIUM-VITAMIN D) 500 MG(1,250MG) -200 UNIT PER TABLET] Take 1 tablet by mouth 2 (two) times a day with meals.       estradiol (ESTRACE) 0.1 MG/GM vaginal cream [ESTRADIOL (ESTRACE) 0.01 % (0.1 MG/GRAM) VAGINAL CREAM] Use 0.5 gm weekly 42.5 g 5     hydrOXYzine HCl (ATARAX) 25 MG tablet Take 1 tablet (25 mg) by mouth every 6 hours as needed for itching or anxiety 30 tablet 0     ipratropium (ATROVENT) 0.06 % nasal spray Spray 2 sprays into both nostrils 3 times daily as needed for rhinitis 30 mL 11     levothyroxine (SYNTHROID/LEVOTHROID) 100 MCG tablet Take 1 tablet (100 mcg) by mouth five times a week. Last fill 5/17, needs apt 20 tablet 1     metoprolol tartrate (LOPRESSOR) 25 MG tablet Take 1 tablet (25 mg) by mouth 2 times daily. 60 tablet 0    No Known Allergies      Lab Results    Chemistry/lipid CBC Cardiac Enzymes/BNP/TSH/INR   Lab Results   Component Value Date    CHOL 213 (H) 01/05/2024    HDL 86 01/05/2024    TRIG 53 01/05/2024    BUN 19.0 01/05/2024     01/05/2024    CO2 26 01/05/2024    Lab Results   Component Value Date    WBC 5.4 11/16/2023    HGB 13.6 11/16/2023    HCT 40.2 11/16/2023    MCV 93 11/16/2023     11/16/2023    @RESUFAST(BMP,CBC,BNP,TSH,  INR)@      25 minutes spent reviewing prior records (including  documentation, laboratory studies, cardiac testing/imaging), history and physical exam, planning, and subsequent documentation.     The longitudinal plan of care for the diagnosis(es)/condition(s) as documented were addressed during this visit. Due to the added complexity in care, I will continue to support Dot in the subsequent management and with ongoing continuity of care.      This note has been dictated using voice recognition software. Any grammatical, typographical, or context distortions are unintentional and inherent to the software.    Driss Nguyen PA-C  Cardiac Electrophysiology  [facility]  Clinic and [phone]  Fax: [phone]  Electrophysiology Nurses: [phone]         Thank you for allowing me to participate in the care of your patient.      Sincerely,     Larry Nguyen PA-C     [facility]  cc:   Toña Cerda MD  [address] [place] [id number]

## 2025-07-17 ENCOUNTER — TELEPHONE (OUTPATIENT)
Dept: FAMILY MEDICINE | Facility: CLINIC | Age: 65
End: 2025-07-17
Payer: COMMERCIAL

## 2025-07-17 NOTE — TELEPHONE ENCOUNTER
Patient Quality Outreach    Patient is due for the following:   Annual PHY    Action(s) Taken:   Patient requests NO further contact/outreach    Type of outreach:    Phone, spoke to patient/parent. [facility] [place]    Questions for provider review:    None         Gloria Temple  Chart routed to [employer].